# Patient Record
Sex: MALE | Race: WHITE | Employment: FULL TIME | URBAN - METROPOLITAN AREA
[De-identification: names, ages, dates, MRNs, and addresses within clinical notes are randomized per-mention and may not be internally consistent; named-entity substitution may affect disease eponyms.]

---

## 2017-11-14 PROBLEM — J45.30 MILD PERSISTENT ASTHMA WITHOUT COMPLICATION: Status: ACTIVE | Noted: 2017-11-14

## 2017-11-14 PROBLEM — J45.30 MILD PERSISTENT ASTHMA WITHOUT COMPLICATION (HCC): Status: ACTIVE | Noted: 2017-11-14

## 2021-08-31 ENCOUNTER — HOSPITAL ENCOUNTER (EMERGENCY)
Facility: HOSPITAL | Age: 51
Discharge: HOME | End: 2021-08-31
Attending: EMERGENCY MEDICINE
Payer: COMMERCIAL

## 2021-08-31 VITALS
OXYGEN SATURATION: 95 % | SYSTOLIC BLOOD PRESSURE: 129 MMHG | TEMPERATURE: 98 F | DIASTOLIC BLOOD PRESSURE: 85 MMHG | RESPIRATION RATE: 20 BRPM | HEART RATE: 75 BPM

## 2021-08-31 DIAGNOSIS — N39.0 URINARY TRACT INFECTION WITHOUT HEMATURIA, SITE UNSPECIFIED: Primary | ICD-10-CM

## 2021-08-31 LAB
ALBUMIN SERPL-MCNC: 3.9 G/DL (ref 3.4–5)
ALBUMIN/GLOB SERPL: 0.9 {RATIO} (ref 1–2)
ALP LIVER SERPL-CCNC: 77 U/L
ALT SERPL-CCNC: 108 U/L
ANION GAP SERPL CALC-SCNC: 10 MMOL/L (ref 0–18)
AST SERPL-CCNC: 44 U/L (ref 15–37)
BASOPHILS # BLD AUTO: 0.03 X10(3) UL (ref 0–0.2)
BASOPHILS NFR BLD AUTO: 0.2 %
BILIRUB SERPL-MCNC: 1.1 MG/DL (ref 0.1–2)
BILIRUB UR QL STRIP.AUTO: NEGATIVE
BUN BLD-MCNC: 14 MG/DL (ref 7–18)
CALCIUM BLD-MCNC: 9.7 MG/DL (ref 8.5–10.1)
CHLORIDE SERPL-SCNC: 102 MMOL/L (ref 98–112)
CO2 SERPL-SCNC: 25 MMOL/L (ref 21–32)
CREAT BLD-MCNC: 1.13 MG/DL
EOSINOPHIL # BLD AUTO: 0.04 X10(3) UL (ref 0–0.7)
EOSINOPHIL NFR BLD AUTO: 0.3 %
ERYTHROCYTE [DISTWIDTH] IN BLOOD BY AUTOMATED COUNT: 13.7 %
GLOBULIN PLAS-MCNC: 4.2 G/DL (ref 2.8–4.4)
GLUCOSE BLD-MCNC: 126 MG/DL (ref 70–99)
GLUCOSE UR STRIP.AUTO-MCNC: NEGATIVE MG/DL
HCT VFR BLD AUTO: 46.2 %
HGB BLD-MCNC: 15.7 G/DL
IMM GRANULOCYTES # BLD AUTO: 0.04 X10(3) UL (ref 0–1)
IMM GRANULOCYTES NFR BLD: 0.3 %
LYMPHOCYTES # BLD AUTO: 1.11 X10(3) UL (ref 1–4)
LYMPHOCYTES NFR BLD AUTO: 7.7 %
M PROTEIN MFR SERPL ELPH: 8.1 G/DL (ref 6.4–8.2)
MCH RBC QN AUTO: 29.1 PG (ref 26–34)
MCHC RBC AUTO-ENTMCNC: 34 G/DL (ref 31–37)
MCV RBC AUTO: 85.6 FL
MONOCYTES # BLD AUTO: 1.19 X10(3) UL (ref 0.1–1)
MONOCYTES NFR BLD AUTO: 8.3 %
NEUTROPHILS # BLD AUTO: 11.92 X10 (3) UL (ref 1.5–7.7)
NEUTROPHILS # BLD AUTO: 11.92 X10(3) UL (ref 1.5–7.7)
NEUTROPHILS NFR BLD AUTO: 83.2 %
NITRITE UR QL STRIP.AUTO: POSITIVE
OSMOLALITY SERPL CALC.SUM OF ELEC: 286 MOSM/KG (ref 275–295)
PH UR STRIP.AUTO: 6 [PH] (ref 5–8)
PLATELET # BLD AUTO: 200 10(3)UL (ref 150–450)
POTASSIUM SERPL-SCNC: 4 MMOL/L (ref 3.5–5.1)
PROT UR STRIP.AUTO-MCNC: 100 MG/DL
RBC # BLD AUTO: 5.4 X10(6)UL
RBC #/AREA URNS AUTO: >10 /HPF
SODIUM SERPL-SCNC: 137 MMOL/L (ref 136–145)
SP GR UR STRIP.AUTO: 1.03 (ref 1–1.03)
UROBILINOGEN UR STRIP.AUTO-MCNC: <2 MG/DL
WBC # BLD AUTO: 14.3 X10(3) UL (ref 4–11)
WBC #/AREA URNS AUTO: >50 /HPF

## 2021-08-31 PROCEDURE — 87186 SC STD MICRODIL/AGAR DIL: CPT | Performed by: EMERGENCY MEDICINE

## 2021-08-31 PROCEDURE — 99284 EMERGENCY DEPT VISIT MOD MDM: CPT

## 2021-08-31 PROCEDURE — 87077 CULTURE AEROBIC IDENTIFY: CPT | Performed by: EMERGENCY MEDICINE

## 2021-08-31 PROCEDURE — 85025 COMPLETE CBC W/AUTO DIFF WBC: CPT | Performed by: EMERGENCY MEDICINE

## 2021-08-31 PROCEDURE — 96365 THER/PROPH/DIAG IV INF INIT: CPT

## 2021-08-31 PROCEDURE — 80053 COMPREHEN METABOLIC PANEL: CPT | Performed by: EMERGENCY MEDICINE

## 2021-08-31 PROCEDURE — 87086 URINE CULTURE/COLONY COUNT: CPT | Performed by: EMERGENCY MEDICINE

## 2021-08-31 PROCEDURE — 81001 URINALYSIS AUTO W/SCOPE: CPT | Performed by: EMERGENCY MEDICINE

## 2021-08-31 RX ORDER — CEPHALEXIN 500 MG/1
500 CAPSULE ORAL 4 TIMES DAILY
Qty: 40 CAPSULE | Refills: 0 | Status: SHIPPED | OUTPATIENT
Start: 2021-08-31 | End: 2021-09-10

## 2021-08-31 RX ORDER — ONDANSETRON 4 MG/1
4 TABLET, ORALLY DISINTEGRATING ORAL EVERY 4 HOURS PRN
Qty: 10 TABLET | Refills: 0 | Status: SHIPPED | OUTPATIENT
Start: 2021-08-31 | End: 2021-09-07

## 2021-09-01 NOTE — ED PROVIDER NOTES
Patient Seen in: [facility] Emergency Department      History   Patient presents with:  Urinary Symptoms    Stated Complaint: Fever, UTI symptoms     HPI/Subjective:   HPI    Josesito Low is a 58-year-old male presenting with fever.   Patient is having so distress  HEENT exam: Normal  Lungs are clear to auscultation bilaterally with no wheezes retractions or rhonchi noted  Cardiovascular exam shows a regular rate and rhythm  Abdomen soft nontender with no rebound tenderness noted  Extremity exam shows no cl emergency department there is no signs of hemodynamic instability feel the patient is a good outpatient candidate patient be discharged home placed on Keflex for outpatient antibiotic and Zofran for nausea patient is return the emergency department for wor

## 2021-09-08 ENCOUNTER — HOSPITAL ENCOUNTER (OUTPATIENT)
Dept: CT IMAGING | Age: 51
Discharge: HOME | End: 2021-09-08
Attending: INTERNAL MEDICINE
Payer: COMMERCIAL

## 2021-09-08 DIAGNOSIS — R31.9 HEMATURIA, UNSPECIFIED TYPE: ICD-10-CM

## 2021-09-08 DIAGNOSIS — R10.9 FLANK PAIN: ICD-10-CM

## 2021-09-08 DIAGNOSIS — N39.0 URINARY TRACT INFECTION WITHOUT HEMATURIA, SITE UNSPECIFIED: ICD-10-CM

## 2021-09-08 PROCEDURE — 74176 CT ABD & PELVIS W/O CONTRAST: CPT | Performed by: INTERNAL MEDICINE

## 2021-09-22 ENCOUNTER — LAB ENCOUNTER (OUTPATIENT)
Dept: LAB | Age: 51
End: 2021-09-22
Attending: INTERNAL MEDICINE
Payer: COMMERCIAL

## 2021-09-22 DIAGNOSIS — N39.0 URINARY TRACT INFECTION WITHOUT HEMATURIA, SITE UNSPECIFIED: ICD-10-CM

## 2021-09-22 LAB
ANION GAP SERPL CALC-SCNC: 2 MMOL/L (ref 0–18)
BASOPHILS # BLD AUTO: 0.04 X10(3) UL (ref 0–0.2)
BASOPHILS NFR BLD AUTO: 0.4 %
BILIRUB UR QL STRIP.AUTO: NEGATIVE
BUN BLD-MCNC: 19 MG/DL (ref 7–18)
CALCIUM BLD-MCNC: 9.3 MG/DL (ref 8.5–10.1)
CHLORIDE SERPL-SCNC: 107 MMOL/L (ref 98–112)
CO2 SERPL-SCNC: 29 MMOL/L (ref 21–32)
COLOR UR AUTO: YELLOW
CREAT BLD-MCNC: 1.3 MG/DL
EOSINOPHIL # BLD AUTO: 0.38 X10(3) UL (ref 0–0.7)
EOSINOPHIL NFR BLD AUTO: 4.1 %
ERYTHROCYTE [DISTWIDTH] IN BLOOD BY AUTOMATED COUNT: 13.9 %
GLUCOSE BLD-MCNC: 107 MG/DL (ref 70–99)
GLUCOSE UR STRIP.AUTO-MCNC: NEGATIVE MG/DL
HCT VFR BLD AUTO: 44.2 %
HGB BLD-MCNC: 14.9 G/DL
IMM GRANULOCYTES # BLD AUTO: 0.03 X10(3) UL (ref 0–1)
IMM GRANULOCYTES NFR BLD: 0.3 %
KETONES UR STRIP.AUTO-MCNC: NEGATIVE MG/DL
LYMPHOCYTES # BLD AUTO: 2.46 X10(3) UL (ref 1–4)
LYMPHOCYTES NFR BLD AUTO: 26.2 %
MCH RBC QN AUTO: 29.3 PG (ref 26–34)
MCHC RBC AUTO-ENTMCNC: 33.7 G/DL (ref 31–37)
MCV RBC AUTO: 87 FL
MONOCYTES # BLD AUTO: 0.65 X10(3) UL (ref 0.1–1)
MONOCYTES NFR BLD AUTO: 6.9 %
NEUTROPHILS # BLD AUTO: 5.82 X10 (3) UL (ref 1.5–7.7)
NEUTROPHILS # BLD AUTO: 5.82 X10(3) UL (ref 1.5–7.7)
NEUTROPHILS NFR BLD AUTO: 62.1 %
NITRITE UR QL STRIP.AUTO: POSITIVE
OSMOLALITY SERPL CALC.SUM OF ELEC: 289 MOSM/KG (ref 275–295)
PATIENT FASTING Y/N/NP: NO
PH UR STRIP.AUTO: 5 [PH] (ref 5–8)
PLATELET # BLD AUTO: 314 10(3)UL (ref 150–450)
POTASSIUM SERPL-SCNC: 4.7 MMOL/L (ref 3.5–5.1)
PROT UR STRIP.AUTO-MCNC: NEGATIVE MG/DL
RBC # BLD AUTO: 5.08 X10(6)UL
RBC #/AREA URNS AUTO: >10 /HPF
SODIUM SERPL-SCNC: 138 MMOL/L (ref 136–145)
SP GR UR STRIP.AUTO: 1.01 (ref 1–1.03)
UROBILINOGEN UR STRIP.AUTO-MCNC: <2 MG/DL
WBC # BLD AUTO: 9.4 X10(3) UL (ref 4–11)
WBC #/AREA URNS AUTO: >50 /HPF
WBC CLUMPS UR QL AUTO: PRESENT /HPF

## 2021-09-22 PROCEDURE — 87077 CULTURE AEROBIC IDENTIFY: CPT

## 2021-09-22 PROCEDURE — 87086 URINE CULTURE/COLONY COUNT: CPT

## 2021-09-22 PROCEDURE — 81001 URINALYSIS AUTO W/SCOPE: CPT

## 2021-09-22 PROCEDURE — 36415 COLL VENOUS BLD VENIPUNCTURE: CPT

## 2021-09-22 PROCEDURE — 80048 BASIC METABOLIC PNL TOTAL CA: CPT

## 2021-09-22 PROCEDURE — 85025 COMPLETE CBC W/AUTO DIFF WBC: CPT

## 2021-09-22 PROCEDURE — 87186 SC STD MICRODIL/AGAR DIL: CPT

## 2021-11-18 ENCOUNTER — ORDER TRANSCRIPTION (OUTPATIENT)
Dept: ADMINISTRATIVE | Facility: HOSPITAL | Age: 51
End: 2021-11-18

## 2021-11-18 DIAGNOSIS — Z13.6 SCREENING FOR CARDIOVASCULAR CONDITION: Primary | ICD-10-CM

## 2021-11-19 ENCOUNTER — HOSPITAL ENCOUNTER (OUTPATIENT)
Dept: CT IMAGING | Facility: HOSPITAL | Age: 51
Discharge: HOME | End: 2021-11-19
Attending: INTERNAL MEDICINE

## 2021-11-19 DIAGNOSIS — Z13.6 SCREENING FOR CARDIOVASCULAR CONDITION: ICD-10-CM

## 2021-11-19 DIAGNOSIS — E78.49 OTHER HYPERLIPIDEMIA: ICD-10-CM

## 2022-04-04 ENCOUNTER — OFFICE VISIT (OUTPATIENT)
Dept: ORTHOPEDICS CLINIC | Facility: CLINIC | Age: 52
End: 2022-04-04
Payer: COMMERCIAL

## 2022-04-04 ENCOUNTER — HOSPITAL ENCOUNTER (OUTPATIENT)
Dept: GENERAL RADIOLOGY | Age: 52
Discharge: HOME | End: 2022-04-04
Attending: ORTHOPAEDIC SURGERY
Payer: COMMERCIAL

## 2022-04-04 VITALS — WEIGHT: 232 LBS | BODY MASS INDEX: 30.75 KG/M2 | HEIGHT: 73 IN

## 2022-04-04 DIAGNOSIS — M79.644 CHRONIC PAIN OF RIGHT THUMB: ICD-10-CM

## 2022-04-04 DIAGNOSIS — G89.29 CHRONIC PAIN OF RIGHT THUMB: ICD-10-CM

## 2022-04-04 DIAGNOSIS — G89.29 CHRONIC PAIN OF RIGHT THUMB: Primary | ICD-10-CM

## 2022-04-04 DIAGNOSIS — M79.644 CHRONIC PAIN OF RIGHT THUMB: Primary | ICD-10-CM

## 2022-04-04 DIAGNOSIS — M65.4 TENOSYNOVITIS OF RADIAL STYLOID: ICD-10-CM

## 2022-04-04 PROCEDURE — 73140 X-RAY EXAM OF FINGER(S): CPT | Performed by: ORTHOPAEDIC SURGERY

## 2022-04-04 PROCEDURE — 20550 NJX 1 TENDON SHEATH/LIGAMENT: CPT | Performed by: ORTHOPAEDIC SURGERY

## 2022-04-04 PROCEDURE — 3008F BODY MASS INDEX DOCD: CPT | Performed by: ORTHOPAEDIC SURGERY

## 2022-04-04 PROCEDURE — 99203 OFFICE O/P NEW LOW 30 MIN: CPT | Performed by: ORTHOPAEDIC SURGERY

## 2022-04-04 RX ORDER — BETAMETHASONE SODIUM PHOSPHATE AND BETAMETHASONE ACETATE 3; 3 MG/ML; MG/ML
6 INJECTION, SUSPENSION INTRA-ARTICULAR; INTRALESIONAL; INTRAMUSCULAR; SOFT TISSUE ONCE
Status: COMPLETED | OUTPATIENT
Start: 2022-04-04 | End: 2022-04-04

## 2022-04-04 RX ADMIN — BETAMETHASONE SODIUM PHOSPHATE AND BETAMETHASONE ACETATE 6 MG: 3; 3 INJECTION, SUSPENSION INTRA-ARTICULAR; INTRALESIONAL; INTRAMUSCULAR; SOFT TISSUE at 14:52:00

## 2023-01-12 ENCOUNTER — APPOINTMENT (OUTPATIENT)
Dept: GENERAL RADIOLOGY | Age: 53
End: 2023-01-12
Attending: NURSE PRACTITIONER
Payer: COMMERCIAL

## 2023-01-12 ENCOUNTER — HOSPITAL ENCOUNTER (OUTPATIENT)
Age: 53
Discharge: HOME | End: 2023-01-12
Payer: COMMERCIAL

## 2023-01-12 VITALS
HEART RATE: 70 BPM | SYSTOLIC BLOOD PRESSURE: 135 MMHG | HEIGHT: 73 IN | BODY MASS INDEX: 31.81 KG/M2 | RESPIRATION RATE: 20 BRPM | TEMPERATURE: 99 F | DIASTOLIC BLOOD PRESSURE: 86 MMHG | WEIGHT: 240 LBS

## 2023-01-12 DIAGNOSIS — R06.2 WHEEZING: ICD-10-CM

## 2023-01-12 DIAGNOSIS — J06.9 UPPER RESPIRATORY TRACT INFECTION, UNSPECIFIED TYPE: Primary | ICD-10-CM

## 2023-01-12 LAB — SARS-COV-2 RNA RESP QL NAA+PROBE: NOT DETECTED

## 2023-01-12 PROCEDURE — 71046 X-RAY EXAM CHEST 2 VIEWS: CPT | Performed by: NURSE PRACTITIONER

## 2023-01-12 PROCEDURE — 99214 OFFICE O/P EST MOD 30 MIN: CPT

## 2023-01-12 PROCEDURE — 99213 OFFICE O/P EST LOW 20 MIN: CPT

## 2023-01-12 RX ORDER — PREDNISONE 20 MG/1
60 TABLET ORAL ONCE
Status: COMPLETED | OUTPATIENT
Start: 2023-01-12 | End: 2023-01-12

## 2023-01-12 RX ORDER — ALBUTEROL SULFATE 90 UG/1
2 AEROSOL, METERED RESPIRATORY (INHALATION) EVERY 4 HOURS PRN
Qty: 1 EACH | Refills: 0 | Status: SHIPPED | OUTPATIENT
Start: 2023-01-12 | End: 2023-02-11

## 2023-01-12 RX ORDER — PREDNISONE 20 MG/1
40 TABLET ORAL DAILY
Qty: 10 TABLET | Refills: 0 | Status: SHIPPED | OUTPATIENT
Start: 2023-01-13 | End: 2023-01-18

## 2023-01-12 RX ORDER — ALBUTEROL SULFATE 90 UG/1
2 AEROSOL, METERED RESPIRATORY (INHALATION) ONCE
Status: COMPLETED | OUTPATIENT
Start: 2023-01-12 | End: 2023-01-12

## 2023-01-19 ENCOUNTER — HOSPITAL ENCOUNTER (OUTPATIENT)
Age: 53
Discharge: HOME | End: 2023-01-19
Attending: EMERGENCY MEDICINE
Payer: COMMERCIAL

## 2023-01-19 ENCOUNTER — APPOINTMENT (OUTPATIENT)
Dept: GENERAL RADIOLOGY | Age: 53
End: 2023-01-19
Attending: EMERGENCY MEDICINE
Payer: COMMERCIAL

## 2023-01-19 VITALS
RESPIRATION RATE: 20 BRPM | TEMPERATURE: 99 F | DIASTOLIC BLOOD PRESSURE: 86 MMHG | OXYGEN SATURATION: 96 % | HEART RATE: 76 BPM | SYSTOLIC BLOOD PRESSURE: 137 MMHG

## 2023-01-19 DIAGNOSIS — J18.9 WALKING PNEUMONIA: ICD-10-CM

## 2023-01-19 DIAGNOSIS — J98.01 BRONCHOSPASM: ICD-10-CM

## 2023-01-19 DIAGNOSIS — J45.41 MODERATE PERSISTENT REACTIVE AIRWAY DISEASE WITH ACUTE EXACERBATION: Primary | ICD-10-CM

## 2023-01-19 PROCEDURE — 99214 OFFICE O/P EST MOD 30 MIN: CPT

## 2023-01-19 PROCEDURE — 71046 X-RAY EXAM CHEST 2 VIEWS: CPT | Performed by: EMERGENCY MEDICINE

## 2023-01-19 PROCEDURE — 99213 OFFICE O/P EST LOW 20 MIN: CPT

## 2023-01-19 RX ORDER — METHYLPREDNISOLONE 4 MG/1
TABLET ORAL
Qty: 1 EACH | Refills: 0 | Status: SHIPPED | OUTPATIENT
Start: 2023-01-19

## 2023-01-19 RX ORDER — ALBUTEROL SULFATE 2.5 MG/3ML
2.5 SOLUTION RESPIRATORY (INHALATION) EVERY 4 HOURS PRN
Qty: 30 EACH | Refills: 0 | Status: SHIPPED | OUTPATIENT
Start: 2023-01-19 | End: 2023-02-18

## 2023-01-19 RX ORDER — ALBUTEROL SULFATE 90 UG/1
AEROSOL, METERED RESPIRATORY (INHALATION) EVERY 4 HOURS PRN
Qty: 1 EACH | Refills: 0 | Status: SHIPPED | OUTPATIENT
Start: 2023-01-19 | End: 2024-01-19

## 2023-01-19 RX ORDER — PREDNISONE 20 MG/1
40 TABLET ORAL ONCE
Status: COMPLETED | OUTPATIENT
Start: 2023-01-19 | End: 2023-01-19

## 2023-01-19 RX ORDER — AZITHROMYCIN 250 MG/1
TABLET, FILM COATED ORAL
Qty: 6 TABLET | Refills: 0 | Status: SHIPPED | OUTPATIENT
Start: 2023-01-19 | End: 2023-01-24

## 2023-01-19 RX ORDER — BENZONATATE 100 MG/1
CAPSULE ORAL NIGHTLY PRN
Qty: 20 CAPSULE | Refills: 0 | Status: SHIPPED | OUTPATIENT
Start: 2023-01-19

## 2023-01-20 NOTE — ED INITIAL ASSESSMENT (HPI)
C/o SOB starting 1/12. Pt reports recent dc from [facility] last week for same symptoms. Pt reports symptoms productive cough with yellow sputum, sneezing. Pt reports taking prescribed meds and finishing meds. Pt reports difficulty sleeping and sitting up while sleeping. Pt sleeps with CPAP. Inspiratory and expiratory wheezing. RR 32. Dyspnea with rest and exertion. SPO2 94% RA.

## 2023-01-20 NOTE — DISCHARGE INSTRUCTIONS
Consider obtaining a pulse oximeter and check the level 3-4 times a day. Medrol Dosepak, start tomorrow. Albuterol 8 puffs every 3-4 hours for the next 3 to 4 days, then taper down as tolerated  Take Symbicort 2 puffs twice a day for the next few weeks  Take 10 deep breaths with the incentive spirometer every hour while awake  Talk to Dr. Katrin Ma regarding an albuterol nebulizer or other treatment.   Take a Z-Manuel in case of a brewing pneumonia that is not yet showing up on chest x-ray  Tessalon if needed to control cough at night so you can sleep  Return for any problems

## 2023-01-20 NOTE — ED NOTES
Patient called stating that Yvonne needs us to call with clarification on the albuterol. Spoke with pharmacist at [facility] and they state the insurance won't cover the 2-8 puffs Q4H PRN - she states she needs additional information/diagnosis to provide the insurance. Notified pharmacist of diagnosis of moderate persistent reactive airway disease with acute exacerbation, walking pneumonia, and bronchitis. She put message into system and states they won't cover it over 16 days. She was notified patient most likely won't need that dose for that length of time and was also recommended to follow-up with his PCP. She verbalizes understanding and will process the prescription for the 16 days.

## 2023-09-20 ENCOUNTER — HOSPITAL ENCOUNTER (OUTPATIENT)
Dept: CT IMAGING | Age: 53
Discharge: HOME | End: 2023-09-20
Attending: ORTHOPAEDIC SURGERY
Payer: COMMERCIAL

## 2023-09-20 DIAGNOSIS — S82.143A TIBIAL PLATEAU FRACTURE: ICD-10-CM

## 2023-09-20 PROCEDURE — 73700 CT LOWER EXTREMITY W/O DYE: CPT | Performed by: ORTHOPAEDIC SURGERY

## 2023-10-12 ENCOUNTER — ORDER TRANSCRIPTION (OUTPATIENT)
Dept: PHYSICAL THERAPY | Facility: HOSPITAL | Age: 53
End: 2023-10-12

## 2023-10-12 DIAGNOSIS — S82.125D: Primary | ICD-10-CM

## 2023-10-16 ENCOUNTER — TELEPHONE (OUTPATIENT)
Dept: PHYSICAL THERAPY | Facility: HOSPITAL | Age: 53
End: 2023-10-16

## 2023-10-17 ENCOUNTER — OFFICE VISIT (OUTPATIENT)
Dept: PHYSICAL THERAPY | Facility: HOSPITAL | Age: 53
End: 2023-10-17
Attending: ORTHOPAEDIC SURGERY
Payer: COMMERCIAL

## 2023-10-17 DIAGNOSIS — S82.125D: Primary | ICD-10-CM

## 2023-10-17 PROCEDURE — 97161 PT EVAL LOW COMPLEX 20 MIN: CPT

## 2023-10-17 PROCEDURE — 97110 THERAPEUTIC EXERCISES: CPT

## 2023-10-20 ENCOUNTER — OFFICE VISIT (OUTPATIENT)
Dept: PHYSICAL THERAPY | Facility: HOSPITAL | Age: 53
End: 2023-10-20
Attending: ORTHOPAEDIC SURGERY
Payer: COMMERCIAL

## 2023-10-20 PROCEDURE — 97110 THERAPEUTIC EXERCISES: CPT

## 2023-10-20 PROCEDURE — 97140 MANUAL THERAPY 1/> REGIONS: CPT

## 2023-10-20 NOTE — PROGRESS NOTES
Diagnosis:   Nondisplaced fracture of lateral condyle of left tibia, subsequent encounter for closed fracture with routine healing (S82.886C)         Referring Provider: Elmo Rankin  Date of Evaluation:    10/17/23    Precautions:   50% WB on L Next MD visit:   none scheduled  Date of Surgery: 9/29/23   Insurance Primary/Secondary: [place] / N/A     # Auth Visits: 15 per POC            Subjective: Patient reports having slightly increased swelling in the L knee now that he has been up on his feet more. Axillary crutches are working out well. He is having some stiffness and soreness in the L posterolateral hip as well as medial knee soreness at night. Pain: 3/10 worst      Objective:   AROM: (* denotes performed with pain)   Knee    Flexion: R WNL; L 115 deg   Extension: R 0; L -2      PROM: (* denotes performed with pain)   Knee    Flexion: R WNL; L 118   Extension: R 0; L 0          Assessment: Patient presents with improved knee ROM compared to IE. There is less anterior knee soreness with active and passive terminal knee extension this session. Tolerated addition of hip strengthening exercises. Slightly limited in L tibial ER.       Goals:   (To be met in 15 visits)   Pt will improve knee extension ROM to 0 deg to allow proper heel strike during gait and terminal knee extension in stance  Pt will improve knee AROM flexion to >120 degrees to improve ability to perform stairs  Pt will improve quad strength to 5/5 to ascend 1 flight of stairs reciprocally without UE assist  Pt will increase hip and knee strength to grossly 4+/5 to be able to get up and down from the floor safely  Pt will demonstrate increased hip ER/ABD strength to 5/5 to perform stepping and squatting activities without excessive femoral IR/ADD  Pt will improve SLS to >10s to improve safety and independence with gait on uneven surfaces such as grass  Pt will be independent and compliant with comprehensive HEP to maintain progress achieved in PT    Plan: continue PT to improve strength, ROM, and function of the LLE  Date: 10/20/2023  TX#: 2/15 Date:                 TX#: 3/ Date:                 TX#: 4/ Date:                 TX#: 5/ Date:    Tx#: 6/   S/l clam, 10; RTB, 10  S/l hip abduction, 2x8  SAQ, 10  SLR, 10; 1#, 10  Seated tibial IR/ER active       Manual:  Passive knee flexion and extension  Tib/fem AP in 90 deg flexion   Prox tib/fib AP in sitting                     HEP: Quad sets  SLR  Clam  S/l hip abduction    Charges: man, 2TE       Total Timed Treatment: 42 min  Total Treatment Time: 42 min

## 2023-10-27 ENCOUNTER — OFFICE VISIT (OUTPATIENT)
Dept: PHYSICAL THERAPY | Facility: HOSPITAL | Age: 53
End: 2023-10-27
Attending: INTERNAL MEDICINE

## 2023-10-27 PROCEDURE — 97110 THERAPEUTIC EXERCISES: CPT

## 2023-10-27 PROCEDURE — 97140 MANUAL THERAPY 1/> REGIONS: CPT

## 2023-10-27 NOTE — PROGRESS NOTES
Diagnosis:   Nondisplaced fracture of lateral condyle of left tibia, subsequent encounter for closed fracture with routine healing (M39.995N)         Referring Provider: No ref. provider found  Date of Evaluation:    10/17/23    Precautions:   50% WB on L Next MD visit:   none scheduled  Date of Surgery: 9/29/23   Insurance Primary/Secondary: [place] / N/A     # Auth Visits: 15 per POC            Subjective: Patient reports feeling continuously better. Still some sweling in the L knee but not really having any pain. Pain: 0/10      Objective:   AROM: (* denotes performed with pain)   Knee    Flexion: R WNL; L 122 deg   Extension: R 0; L 0      PROM: (* denotes performed with pain)   Knee    Flexion: R WNL; L 124   Extension: R 0; L 0          Assessment: Patient continues to improve in knee ROM. He tolerated light closed chain exercises with WB up to 50%. Has follow up with surgeon next week and will likely progress WB status at that point.        Goals:   (To be met in 15 visits)   Pt will improve knee extension ROM to 0 deg to allow proper heel strike during gait and terminal knee extension in stance  Pt will improve knee AROM flexion to >120 degrees to improve ability to perform stairs  Pt will improve quad strength to 5/5 to ascend 1 flight of stairs reciprocally without UE assist  Pt will increase hip and knee strength to grossly 4+/5 to be able to get up and down from the floor safely  Pt will demonstrate increased hip ER/ABD strength to 5/5 to perform stepping and squatting activities without excessive femoral IR/ADD  Pt will improve SLS to >10s to improve safety and independence with gait on uneven surfaces such as grass  Pt will be independent and compliant with comprehensive HEP to maintain progress achieved in PT    Plan: continue PT to improve strength, ROM, and function of the LLE  Date: 10/20/2023  TX#: 2/15 Date:  10/27/23               TX#: 3/15 Date:                 TX#: 4/ Date: TX#: 5/ Date:    Tx#: 6/   S/l clam, 10; RTB, 10  S/l hip abduction, 2x8  SAQ, 10  SLR, 10; 1#, 10  Seated tibial IR/ER active Standing LAQ, YTB, 2x10  Standing SLR, YTB, 10  Forward lunge onto 8\" step partial WB, 15  Airex weight shift, 20  Gastroc stretch, 3x20 sec  Bridge, 10  HS stretch, 3x30 sec        Manual:  Passive knee flexion and extension  Tib/fem AP in 90 deg flexion   Prox tib/fib AP in sitting Manual:  Passive knee flexion and extension  Tib/fem AP in 90 deg flexion                     HEP: Quad sets  SLR  Clam  S/l hip abduction    Charges: man, 2TE       Total Timed Treatment: 42 min  Total Treatment Time: 42 min

## 2023-11-03 ENCOUNTER — OFFICE VISIT (OUTPATIENT)
Dept: PHYSICAL THERAPY | Facility: HOSPITAL | Age: 53
End: 2023-11-03
Attending: INTERNAL MEDICINE
Payer: COMMERCIAL

## 2023-11-03 PROCEDURE — 97140 MANUAL THERAPY 1/> REGIONS: CPT

## 2023-11-03 PROCEDURE — 97110 THERAPEUTIC EXERCISES: CPT

## 2023-11-03 NOTE — PROGRESS NOTES
Diagnosis:   Nondisplaced fracture of lateral condyle of left tibia, subsequent encounter for closed fracture with routine healing (J96.583C)         Referring Provider: No ref. provider found  Date of Evaluation:    10/17/23    Precautions:   50% WB on L Next MD visit:   none scheduled  Date of Surgery: 9/29/23   Insurance Primary/Secondary: [place] / N/A     # Auth Visits: 15 per POC            Subjective: Patient saw his surgeon this week and got clearance for WBAT. Also cleared to drive and fly. Has been walking without crutches but it feels \"funny\". Sometimes has a sharp pain in the medial knee and sometimes anterior. Also reports paresthesia in his medial arch when walking. Pain: 0/10      Objective:   AROM: (* denotes performed with pain)   Knee    Flexion: R WNL; L 122 deg   Extension: R 0; L 0      PROM: (* denotes performed with pain)   Knee    Flexion: R WNL; L 135   Extension: R 0; L 0          Assessment: Patient tolerated weight bearing exercises today. He does have limited knee extension with gait which improved with cuing. Also some antalgia and anterior knee pain with longer stride. Added standing hip flexor stretch to help with stride length. Also added open chain quad strengthening with GTB.        Goals:   (To be met in 15 visits)   Pt will improve knee extension ROM to 0 deg to allow proper heel strike during gait and terminal knee extension in stance  Pt will improve knee AROM flexion to >120 degrees to improve ability to perform stairs  Pt will improve quad strength to 5/5 to ascend 1 flight of stairs reciprocally without UE assist  Pt will increase hip and knee strength to grossly 4+/5 to be able to get up and down from the floor safely  Pt will demonstrate increased hip ER/ABD strength to 5/5 to perform stepping and squatting activities without excessive femoral IR/ADD  Pt will improve SLS to >10s to improve safety and independence with gait on uneven surfaces such as grass  Pt will be independent and compliant with comprehensive HEP to maintain progress achieved in PT    Plan: continue PT to improve strength, ROM, and function of the LLE  Date: 10/20/2023  TX#: 2/15 Date:  10/27/23               TX#: 3/15 Date:  11/3/23               TX#: 4/15 Date:                 TX#: 5/ Date:    Tx#: 6/   S/l clam, 10; RTB, 10  S/l hip abduction, 2x8  SAQ, 10  SLR, 10; 1#, 10  Seated tibial IR/ER active Standing LAQ, YTB, 2x10  Standing SLR, YTB, 10  Forward lunge onto 8\" step partial WB, 15  Airex weight shift, 20  Gastroc stretch, 3x20 sec  Bridge, 10  HS stretch, 3x30 sec   Stationary bike, lv 2, 5 min  Gait training in // bars, 5 min  Tiltboard AP and ML, 5 min  Lateral band walk, GTB, 3 laps  FSU, 4\" step, 2x10  Standing HF stretch, 5x10 sec  Seated knee extension GTB, 2x12     Manual:  Passive knee flexion and extension  Tib/fem AP in 90 deg flexion   Prox tib/fib AP in sitting Manual:  Passive knee flexion and extension  Tib/fem AP in 90 deg flexion  Manual:  Passive knee flexion and extension  Tib/fem AP in 0 deg flexion                    HEP: Quad sets  Seated knee extension, GTB  Clam, GTB  Standing HF stretch    Charges: man, 2TE       Total Timed Treatment: 45 min  Total Treatment Time: 45 min

## 2023-11-10 ENCOUNTER — OFFICE VISIT (OUTPATIENT)
Dept: PHYSICAL THERAPY | Facility: HOSPITAL | Age: 53
End: 2023-11-10
Attending: INTERNAL MEDICINE
Payer: COMMERCIAL

## 2023-11-10 PROCEDURE — 97140 MANUAL THERAPY 1/> REGIONS: CPT

## 2023-11-10 PROCEDURE — 97110 THERAPEUTIC EXERCISES: CPT

## 2023-11-10 NOTE — PROGRESS NOTES
Diagnosis:   Nondisplaced fracture of lateral condyle of left tibia, subsequent encounter for closed fracture with routine healing (R47.889S)         Referring Provider: No ref. provider found  Date of Evaluation:    10/17/23    Precautions:   50% WB on L Next MD visit:   none scheduled  Date of Surgery: 9/29/23   Insurance Primary/Secondary: [place] / N/A     # Auth Visits: 15 per POC            Subjective: Patient was out of town last week. Had increased swelling after being on the plane with swelling to the ankle but this was back to baseline by the following day. Still having some anterior and medial knee soreness with full extension. Ambulation without crutches has been ok but the L leg still feels weak. Pain: 0/10      Objective:   AROM: (* denotes performed with pain)   Knee    Flexion: R WNL; L 122 deg   Extension: R 0; L 0      PROM: (* denotes performed with pain)   Knee    Flexion: R WNL; L 135   Extension: R 0; L 0          Assessment: Patient responded well to joint mobilizations in prone with improved knee extension and decreased pain with walking. Tolerated increase in resistance with exercises, including squatting and this was added to HEP along with practicing single leg balance.        Goals:   (To be met in 15 visits)   Pt will improve knee extension ROM to 0 deg to allow proper heel strike during gait and terminal knee extension in stance  Pt will improve knee AROM flexion to >120 degrees to improve ability to perform stairs  Pt will improve quad strength to 5/5 to ascend 1 flight of stairs reciprocally without UE assist  Pt will increase hip and knee strength to grossly 4+/5 to be able to get up and down from the floor safely  Pt will demonstrate increased hip ER/ABD strength to 5/5 to perform stepping and squatting activities without excessive femoral IR/ADD  Pt will improve SLS to >10s to improve safety and independence with gait on uneven surfaces such as grass  Pt will be independent and compliant with comprehensive HEP to maintain progress achieved in PT    Plan: continue PT to improve strength, ROM, and function of the LLE  Date: 10/20/2023  TX#: 2/15 Date:  10/27/23               TX#: 3/15 Date:  11/3/23               TX#: 4/15 Date:  11/10/23               TX#: 5/15 Date:    Tx#: 6/   S/l clam, 10; RTB, 10  S/l hip abduction, 2x8  SAQ, 10  SLR, 10; 1#, 10  Seated tibial IR/ER active Standing LAQ, YTB, 2x10  Standing SLR, YTB, 10  Forward lunge onto 8\" step partial WB, 15  Airex weight shift, 20  Gastroc stretch, 3x20 sec  Bridge, 10  HS stretch, 3x30 sec   Stationary bike, lv 2, 5 min  Gait training in // bars, 5 min  Tiltboard AP and ML, 5 min  Lateral band walk, GTB, 3 laps  FSU, 4\" step, 2x10  Standing HF stretch, 5x10 sec  Seated knee extension GTB, 2x12 Stationary bike, lv 2, 5 min  Knee extension/HS stretch on step, 3x10 sec  Lateral band walk, GTB, 2 laps  Retro band walk, GTB, 2 laps  Shuttle SLP, 37#, 2x12  Squat at table, 2x5  Seated knee extension, 25#, 2x10  Prone quad stretch, 3x15 sec    Manual:  Passive knee flexion and extension  Tib/fem AP in 90 deg flexion   Prox tib/fib AP in sitting Manual:  Passive knee flexion and extension  Tib/fem AP in 90 deg flexion  Manual:  Passive knee flexion and extension  Tib/fem AP in 0 deg flexion  Manual:  Passive knee flexion and extension  Tib/fem AP in 5 deg flexion in prone                  HEP: Quad sets  Seated knee extension, GTB  Clam, GTB  Standing HF stretch  Mini squat, 3x5  SLS practice    Charges: man (10 min), 2TE (30 min)      Total Timed Treatment: 40 min  Total Treatment Time: 40 min

## 2023-11-13 ENCOUNTER — OFFICE VISIT (OUTPATIENT)
Dept: PHYSICAL THERAPY | Facility: HOSPITAL | Age: 53
End: 2023-11-13
Attending: INTERNAL MEDICINE
Payer: COMMERCIAL

## 2023-11-13 PROCEDURE — 97140 MANUAL THERAPY 1/> REGIONS: CPT

## 2023-11-13 PROCEDURE — 97110 THERAPEUTIC EXERCISES: CPT

## 2023-11-13 NOTE — PROGRESS NOTES
Diagnosis:   Nondisplaced fracture of lateral condyle of left tibia, subsequent encounter for closed fracture with routine healing (I81.645U)         Referring Provider: No ref. provider found  Date of Evaluation:    10/17/23    Precautions:   50% WB on L Next MD visit:   none scheduled  Date of Surgery: 9/29/23   Insurance Primary/Secondary: [place] / N/[place]     # Auth Visits: 15 per POC            Subjective: Patient reports feeling stiff in the L knee the day after previous session but continued walking and doing his exercises and feels ok overall. Pain: 0/10      Objective:   AROM: (* denotes performed with pain)   Knee    Flexion: R WNL; L 122 deg   Extension: R 0; L 0      PROM: (* denotes performed with pain)   Knee    Flexion: R WNL; L 135   Extension: R 0; L 0          Assessment: Patient continues to favor the LLE with ambulation. There is limitation in quad strength on the L that likely contributes to this. Also limited balance on the L side contributing to deficits in stance phase stability during gait. Added seated knee flexion and extension w/ TB to HEP.        Goals:   (To be met in 15 visits)   Pt will improve knee extension ROM to 0 deg to allow proper heel strike during gait and terminal knee extension in stance  Pt will improve knee AROM flexion to >120 degrees to improve ability to perform stairs  Pt will improve quad strength to 5/5 to ascend 1 flight of stairs reciprocally without UE assist  Pt will increase hip and knee strength to grossly 4+/5 to be able to get up and down from the floor safely  Pt will demonstrate increased hip ER/ABD strength to 5/5 to perform stepping and squatting activities without excessive femoral IR/ADD  Pt will improve SLS to >10s to improve safety and independence with gait on uneven surfaces such as grass  Pt will be independent and compliant with comprehensive HEP to maintain progress achieved in PT    Plan: continue PT to improve strength, ROM, and function of the LLE  Date: 10/20/2023  TX#: 2/15 Date:  10/27/23               TX#: 3/15 Date:  11/3/23               TX#: 4/15 Date:  11/10/23               TX#: 5/15 Date: 11/13/23  Tx#: 6/15   S/l clam, 10; RTB, 10  S/l hip abduction, 2x8  SAQ, 10  SLR, 10; 1#, 10  Seated tibial IR/ER active Standing LAQ, YTB, 2x10  Standing SLR, YTB, 10  Forward lunge onto 8\" step partial WB, 15  Airex weight shift, 20  Gastroc stretch, 3x20 sec  Bridge, 10  HS stretch, 3x30 sec   Stationary bike, lv 2, 5 min  Gait training in // bars, 5 min  Tiltboard AP and ML, 5 min  Lateral band walk, GTB, 3 laps  FSU, 4\" step, 2x10  Standing HF stretch, 5x10 sec  Seated knee extension GTB, 2x12 Stationary bike, lv 2, 5 min  Knee extension/HS stretch on step, 3x10 sec  Lateral band walk, GTB, 2 laps  Retro band walk, GTB, 2 laps  Shuttle SLP, 37#, 2x12  Squat at table, 2x5  Seated knee extension, 25#, 2x10  Prone quad stretch, 3x15 sec Stationary bike, lv 3, 5 min  Shuttle SLP, 37#, 2x12  Bridge, 10; single leg, 8; w/ SB, 8  Squat to table, 2x8  Lunge on step, 6\", 2x10  Seated knee extension, BTB, 2x10  Seated knee flexion, BTB, 10  Airex SLS, 8x5sec   Manual:  Passive knee flexion and extension  Tib/fem AP in 90 deg flexion   Prox tib/fib AP in sitting Manual:  Passive knee flexion and extension  Tib/fem AP in 90 deg flexion  Manual:  Passive knee flexion and extension  Tib/fem AP in 0 deg flexion  Manual:  Passive knee flexion and extension  Tib/fem AP in 5 deg flexion in prone Manual:  Passive knee flexion and extension  Tib/fem AP in 5 deg flexion in prone                 HEP:   Seated knee flexion and extension, BTB  Mini squat, 3x5  SLS practice    Charges: man (10 min), 2TE (30 min)      Total Timed Treatment: 40 min  Total Treatment Time: 40 min

## 2023-11-17 ENCOUNTER — OFFICE VISIT (OUTPATIENT)
Dept: PHYSICAL THERAPY | Facility: HOSPITAL | Age: 53
End: 2023-11-17
Attending: INTERNAL MEDICINE
Payer: COMMERCIAL

## 2023-11-17 PROCEDURE — 97140 MANUAL THERAPY 1/> REGIONS: CPT

## 2023-11-17 PROCEDURE — 97110 THERAPEUTIC EXERCISES: CPT

## 2023-11-17 NOTE — PROGRESS NOTES
Diagnosis:   Nondisplaced fracture of lateral condyle of left tibia, subsequent encounter for closed fracture with routine healing (D80.276X)         Referring Provider: No ref. provider found  Date of Evaluation:    10/17/23    Precautions:   50% WB on L Next MD visit:   none scheduled  Date of Surgery: 9/29/23   Insurance Primary/Secondary: [place] / N/A     # Auth Visits: 15 per POC            Subjective: Patient reports feeling better overall - about 70% back to normal. Still has some stiffness after prolonged sitting. Walking has been better and he feels that he is limping less. Pain: 0/10      Objective:   AROM: (* denotes performed with pain)   Knee    Flexion: R WNL; L 125 deg   Extension: R 0; L 0      PROM: (* denotes performed with pain)   Knee    Flexion: R 135; L 135   Extension: R 0; L 0      Single leg balance: R 28 sec; L 15 sec    Assessment: Patient demonstrates improved quality of gait and gait speed. He has limited SLS on the L compared to the R and did have mild \"pinching\" with this that improved following manual therapy. Progressed weight bearing exercises but he felt hesitant with forward lunge on 2\" step. Will work on SLS at home.       Goals:   (To be met in 15 visits)   Pt will improve knee extension ROM to 0 deg to allow proper heel strike during gait and terminal knee extension in stance  Pt will improve knee AROM flexion to >120 degrees to improve ability to perform stairs  Pt will improve quad strength to 5/5 to ascend 1 flight of stairs reciprocally without UE assist  Pt will increase hip and knee strength to grossly 4+/5 to be able to get up and down from the floor safely  Pt will demonstrate increased hip ER/ABD strength to 5/5 to perform stepping and squatting activities without excessive femoral IR/ADD  Pt will improve SLS to >10s to improve safety and independence with gait on uneven surfaces such as grass  Pt will be independent and compliant with comprehensive HEP to maintain progress achieved in PT    Plan: continue PT to improve strength, ROM, and function of the LLE  Date: 10/20/2023  TX#: 2/15 Date:  10/27/23               TX#: 3/15 Date:  11/3/23               TX#: 4/15 Date:  11/10/23               TX#: 5/15 Date: 11/13/23  Tx#: 6/15 Date: 11/17/23  Tx#: 7/15   S/l clam, 10; RTB, 10  S/l hip abduction, 2x8  SAQ, 10  SLR, 10; 1#, 10  Seated tibial IR/ER active Standing LAQ, YTB, 2x10  Standing SLR, YTB, 10  Forward lunge onto 8\" step partial WB, 15  Airex weight shift, 20  Gastroc stretch, 3x20 sec  Bridge, 10  HS stretch, 3x30 sec   Stationary bike, lv 2, 5 min  Gait training in // bars, 5 min  Tiltboard AP and ML, 5 min  Lateral band walk, GTB, 3 laps  FSU, 4\" step, 2x10  Standing HF stretch, 5x10 sec  Seated knee extension GTB, 2x12 Stationary bike, lv 2, 5 min  Knee extension/HS stretch on step, 3x10 sec  Lateral band walk, GTB, 2 laps  Retro band walk, GTB, 2 laps  Shuttle SLP, 37#, 2x12  Squat at table, 2x5  Seated knee extension, 25#, 2x10  Prone quad stretch, 3x15 sec Stationary bike, lv 3, 5 min  Shuttle SLP, 37#, 2x12  Bridge, 10; single leg, 8; w/ SB, 8  Squat to table, 2x8  Lunge on step, 6\", 2x10  Seated knee extension, BTB, 2x10  Seated knee flexion, BTB, 10  Airex SLS, 8x5sec Stationary bike, lv 3, 5 min  Forward step to SLS  BOSU squat, 2x8  Seated HS curl, 25#, 2x12  Shuttle SLP, 43#, 2x12  SLS practice  Forward lunge on 2\" step, 2x8     Manual:  Passive knee flexion and extension  Tib/fem AP in 90 deg flexion   Prox tib/fib AP in sitting Manual:  Passive knee flexion and extension  Tib/fem AP in 90 deg flexion  Manual:  Passive knee flexion and extension  Tib/fem AP in 0 deg flexion  Manual:  Passive knee flexion and extension  Tib/fem AP in 5 deg flexion in prone Manual:  Passive knee flexion and extension  Tib/fem AP in 5 deg flexion in prone Manual:  Passive knee flexion and extension  Tib/fem AP and lateral glide 90 deg flexion                   HEP: Seated knee flexion and extension, BTB  Mini squat, 3x5  SLS practice    Charges: man (10 min), 2TE (30 min)      Total Timed Treatment: 40 min  Total Treatment Time: 40 min

## 2023-11-20 ENCOUNTER — OFFICE VISIT (OUTPATIENT)
Dept: PHYSICAL THERAPY | Facility: HOSPITAL | Age: 53
End: 2023-11-20
Attending: INTERNAL MEDICINE
Payer: COMMERCIAL

## 2023-11-20 PROCEDURE — 97110 THERAPEUTIC EXERCISES: CPT

## 2023-11-20 PROCEDURE — 97140 MANUAL THERAPY 1/> REGIONS: CPT

## 2023-11-20 NOTE — PROGRESS NOTES
Diagnosis:   Nondisplaced fracture of lateral condyle of left tibia, subsequent encounter for closed fracture with routine healing (Y89.231Y)         Referring Provider: No ref. provider found  Date of Evaluation:    10/17/23    Precautions:   50% WB on L Next MD visit:   none scheduled  Date of Surgery: 9/29/23   Insurance Primary/Secondary: [employer] [place] P[place] / N/A     # Auth Visits: 15 per POC            Subjective: Patient reports having more discomfort/soreness today. He was out of town over the weekend and did more walking, including stairs, than he had been doing previously. Pain: 0/10 (\"not pain, just discomfort\")      Objective:   AROM: (* denotes performed with pain)   Knee    Flexion: R WNL; L 125 deg   Extension: R 0; L 0      PROM: (* denotes performed with pain)   Knee    Flexion: R 135; L 135   Extension: R 0; L 0      Single leg balance: R 28 sec; L 15 sec    Assessment: Patient presents with increased swelling in the L knee. Did well with closed chain exercises but did have pain with modified RDL. Had the same pain with supine passive HS stretching. Patient advised to ice more over the next 1-2 days and hold on weight bearing exercises until symptoms return to baseline.        Goals:   (To be met in 15 visits)   Pt will improve knee extension ROM to 0 deg to allow proper heel strike during gait and terminal knee extension in stance  Pt will improve knee AROM flexion to >120 degrees to improve ability to perform stairs  Pt will improve quad strength to 5/5 to ascend 1 flight of stairs reciprocally without UE assist  Pt will increase hip and knee strength to grossly 4+/5 to be able to get up and down from the floor safely  Pt will demonstrate increased hip ER/ABD strength to 5/5 to perform stepping and squatting activities without excessive femoral IR/ADD  Pt will improve SLS to >10s to improve safety and independence with gait on uneven surfaces such as grass  Pt will be independent and compliant with comprehensive HEP to maintain progress achieved in PT    Plan: continue PT to improve strength, ROM, and function of the LLE  Date: 10/20/2023  TX#: 2/15 Date:  10/27/23               TX#: 3/15 Date:  11/3/23               TX#: 4/15 Date:  11/10/23               TX#: 5/15 Date: 11/13/23  Tx#: 6/15 Date: 11/17/23  Tx#: 7/15 Date: 11/20/23  Tx#: 8/15   S/l clam, 10; RTB, 10  S/l hip abduction, 2x8  SAQ, 10  SLR, 10; 1#, 10  Seated tibial IR/ER active Standing LAQ, YTB, 2x10  Standing SLR, YTB, 10  Forward lunge onto 8\" step partial WB, 15  Airex weight shift, 20  Gastroc stretch, 3x20 sec  Bridge, 10  HS stretch, 3x30 sec   Stationary bike, lv 2, 5 min  Gait training in // bars, 5 min  Tiltboard AP and ML, 5 min  Lateral band walk, GTB, 3 laps  FSU, 4\" step, 2x10  Standing HF stretch, 5x10 sec  Seated knee extension GTB, 2x12 Stationary bike, lv 2, 5 min  Knee extension/HS stretch on step, 3x10 sec  Lateral band walk, GTB, 2 laps  Retro band walk, GTB, 2 laps  Shuttle SLP, 37#, 2x12  Squat at table, 2x5  Seated knee extension, 25#, 2x10  Prone quad stretch, 3x15 sec Stationary bike, lv 3, 5 min  Shuttle SLP, 37#, 2x12  Bridge, 10; single leg, 8; w/ SB, 8  Squat to table, 2x8  Lunge on step, 6\", 2x10  Seated knee extension, BTB, 2x10  Seated knee flexion, BTB, 10  Airex SLS, 8x5sec Stationary bike, lv 3, 5 min  Forward step to SLS  BOSU squat, 2x8  Seated HS curl, 25#, 2x12  Shuttle SLP, 43#, 2x12  SLS practice  Forward lunge on 2\" step, 2x8   Treadmill walking  RDL in // bars - not tolerated  Slider retro lunge, 2x8  Chair knee flexion stretch  Seated knee extension, GTB, 15  Forward and retro walk, GTB, 2 laps each  Single leg heel raise off step, 2x8   Manual:  Passive knee flexion and extension  Tib/fem AP in 90 deg flexion   Prox tib/fib AP in sitting Manual:  Passive knee flexion and extension  Tib/fem AP in 90 deg flexion  Manual:  Passive knee flexion and extension  Tib/fem AP in 0 deg flexion  Manual:  Passive knee flexion and extension  Tib/fem AP in 5 deg flexion in prone Manual:  Passive knee flexion and extension  Tib/fem AP in 5 deg flexion in prone Manual:  Passive knee flexion and extension  Tib/fem AP and lateral glide 90 deg flexion Manual:  Passive knee flexion and extension  Tib/fem AP and lateral glide 90 deg flexion                     HEP:   Seated knee flexion and extension, BTB  Mini squat, 3x5  SLS practice    Charges: man (10 min), 2TE (30 min)      Total Timed Treatment: 40 min  Total Treatment Time: 40 min

## 2023-11-24 ENCOUNTER — APPOINTMENT (OUTPATIENT)
Dept: PHYSICAL THERAPY | Facility: HOSPITAL | Age: 53
End: 2023-11-24
Attending: INTERNAL MEDICINE
Payer: COMMERCIAL

## 2023-11-27 ENCOUNTER — APPOINTMENT (OUTPATIENT)
Dept: PHYSICAL THERAPY | Facility: HOSPITAL | Age: 53
End: 2023-11-27
Attending: INTERNAL MEDICINE
Payer: COMMERCIAL

## 2023-12-01 ENCOUNTER — APPOINTMENT (OUTPATIENT)
Dept: PHYSICAL THERAPY | Facility: HOSPITAL | Age: 53
End: 2023-12-01
Attending: INTERNAL MEDICINE
Payer: COMMERCIAL

## 2023-12-04 ENCOUNTER — OFFICE VISIT (OUTPATIENT)
Dept: PHYSICAL THERAPY | Facility: HOSPITAL | Age: 53
End: 2023-12-04
Attending: INTERNAL MEDICINE
Payer: COMMERCIAL

## 2023-12-04 PROCEDURE — 97140 MANUAL THERAPY 1/> REGIONS: CPT

## 2023-12-04 PROCEDURE — 97110 THERAPEUTIC EXERCISES: CPT

## 2023-12-04 NOTE — PROGRESS NOTES
Diagnosis:   Nondisplaced fracture of lateral condyle of left tibia, subsequent encounter for closed fracture with routine healing (A32.370O)         Referring Provider: No ref. provider found  Date of Evaluation:    10/17/23    Precautions:   50% WB on L Next MD visit:   none scheduled  Date of Surgery: 9/29/23   Insurance Primary/Secondary: [employer] [place] / N/A     # Auth Visits: 15 per POC            Subjective: Patient reports feeling better overall since previous session. Less sharp pain in the anteromedial knee but still having soreness/discomfort/tightness in the posterolateral knee. Also sometimes still feels instability in the L knee with walking or stairs. Pain: 0/10 (\"not pain, just discomfort\")      Objective:   AROM: (* denotes performed with pain)   Knee    Flexion: R WNL; L 125 deg   Extension: R 0; L 0      PROM: (* denotes performed with pain)   Knee    Flexion: R 135; L 135   Extension: R 0; L 0      Single leg balance: R 28 sec; L 28 sec    Assessment: Patient presents decreased swelling compared to previous session and improved SLS time. He responded well to IASTM of the distal quad and ITB with decreased soreness on knee stretching and with walking. He is going to do self-STM at home until next session.        Goals:   (To be met in 15 visits)   Pt will improve knee extension ROM to 0 deg to allow proper heel strike during gait and terminal knee extension in stance  Pt will improve knee AROM flexion to >120 degrees to improve ability to perform stairs  Pt will improve quad strength to 5/5 to ascend 1 flight of stairs reciprocally without UE assist  Pt will increase hip and knee strength to grossly 4+/5 to be able to get up and down from the floor safely  Pt will demonstrate increased hip ER/ABD strength to 5/5 to perform stepping and squatting activities without excessive femoral IR/ADD  Pt will improve SLS to >10s to improve safety and independence with gait on uneven surfaces such as grass  Pt will be independent and compliant with comprehensive HEP to maintain progress achieved in PT    Plan: continue PT to improve strength, ROM, and function of the LLE  Date: 10/20/2023  TX#: 2/15 Date:  10/27/23               TX#: 3/15 Date:  11/3/23               TX#: 4/15 Date:  11/10/23               TX#: 5/15 Date: 11/13/23  Tx#: 6/15 Date: 11/17/23  Tx#: 7/15 Date: 11/20/23  Tx#: 8/15 Date: 12/4/23  Tx#: 9/15   S/l clam, 10; RTB, 10  S/l hip abduction, 2x8  SAQ, 10  SLR, 10; 1#, 10  Seated tibial IR/ER active Standing LAQ, YTB, 2x10  Standing SLR, YTB, 10  Forward lunge onto 8\" step partial WB, 15  Airex weight shift, 20  Gastroc stretch, 3x20 sec  Bridge, 10  HS stretch, 3x30 sec   Stationary bike, lv 2, 5 min  Gait training in // bars, 5 min  Tiltboard AP and ML, 5 min  Lateral band walk, GTB, 3 laps  FSU, 4\" step, 2x10  Standing HF stretch, 5x10 sec  Seated knee extension GTB, 2x12 Stationary bike, lv 2, 5 min  Knee extension/HS stretch on step, 3x10 sec  Lateral band walk, GTB, 2 laps  Retro band walk, GTB, 2 laps  Shuttle SLP, 37#, 2x12  Squat at table, 2x5  Seated knee extension, 25#, 2x10  Prone quad stretch, 3x15 sec Stationary bike, lv 3, 5 min  Shuttle SLP, 37#, 2x12  Bridge, 10; single leg, 8; w/ SB, 8  Squat to table, 2x8  Lunge on step, 6\", 2x10  Seated knee extension, BTB, 2x10  Seated knee flexion, BTB, 10  Airex SLS, 8x5sec Stationary bike, lv 3, 5 min  Forward step to SLS  BOSU squat, 2x8  Seated HS curl, 25#, 2x12  Shuttle SLP, 43#, 2x12  SLS practice  Forward lunge on 2\" step, 2x8   Treadmill walking  RDL in // bars - not tolerated  Slider retro lunge, 2x8  Chair knee flexion stretch  Seated knee extension, GTB, 15  Forward and retro walk, GTB, 2 laps each  Single leg heel raise off step, 2x8 Shuttle SLP, 62#, 3x12  SLS  ML tiltboard, static and dynamic on // bars, 5 min  BTB lateral step to SLS  BTB ankle eversion w/ lateral step  Supine active HS stretching, 5 min   Manual:  Passive knee flexion and extension  Tib/fem AP in 90 deg flexion   Prox tib/fib AP in sitting Manual:  Passive knee flexion and extension  Tib/fem AP in 90 deg flexion  Manual:  Passive knee flexion and extension  Tib/fem AP in 0 deg flexion  Manual:  Passive knee flexion and extension  Tib/fem AP in 5 deg flexion in prone Manual:  Passive knee flexion and extension  Tib/fem AP in 5 deg flexion in prone Manual:  Passive knee flexion and extension  Tib/fem AP and lateral glide 90 deg flexion Manual:  Passive knee flexion and extension  Tib/fem AP and lateral glide 90 deg flexion Manual:  Tib/fem lateral glide in 90 deg flexion  Distal quad/ITB IASTM                       HEP:   Seated knee flexion and extension, BTB  Mini squat, 3x5  SLS practice    Charges: man (10 min), 2TE (30 min)      Total Timed Treatment: 40 min  Total Treatment Time: 40 min

## 2023-12-08 ENCOUNTER — APPOINTMENT (OUTPATIENT)
Dept: PHYSICAL THERAPY | Facility: HOSPITAL | Age: 53
End: 2023-12-08
Attending: INTERNAL MEDICINE
Payer: COMMERCIAL

## 2023-12-15 ENCOUNTER — OFFICE VISIT (OUTPATIENT)
Dept: PHYSICAL THERAPY | Facility: HOSPITAL | Age: 53
End: 2023-12-15
Attending: INTERNAL MEDICINE
Payer: COMMERCIAL

## 2023-12-15 PROCEDURE — 97140 MANUAL THERAPY 1/> REGIONS: CPT

## 2023-12-15 PROCEDURE — 97110 THERAPEUTIC EXERCISES: CPT

## 2023-12-15 PROCEDURE — 97016 VASOPNEUMATIC DEVICE THERAPY: CPT

## 2023-12-15 NOTE — PROGRESS NOTES
Diagnosis:   Nondisplaced fracture of lateral condyle of left tibia, subsequent encounter for closed fracture with routine healing (U38.270R)         Referring Provider: No ref. provider found  Date of Evaluation:    10/17/23    Precautions:   50% WB on L Next MD visit:   none scheduled  Date of Surgery: 9/29/23   Insurance Primary/Secondary: [employer] [place] / N/A     # Auth Visits: 15 per POC            Subjective: Patient reports having increased swelling and discomfort in the L knee after being out of town and doing a lot of travel and walking with increased weight in his luggage over the last week. Pain: 0/10 (\"not pain, just discomfort\")      Objective:   AROM: (* denotes performed with pain)   Knee    Flexion: R WNL; L 125 deg   Extension: R 0; L 0      PROM: (* denotes performed with pain)   Knee    Flexion: R 135; L 135   Extension: R 0; L 0      Single leg balance: R 28 sec; L 28 sec    Assessment: Patient presents with increased swelling and increased antalgia with gait. He has reproduction of symptoms with rotational movements. This improved following lateral tibiofemoral joint glides. He is going to perform light stretching and seated knee tibial rotation until next session. He is doing well when swelling is decreased but has difficulty with walking and stairs when swelling is increased.        Goals:   (To be met in 15 visits)   Pt will improve knee extension ROM to 0 deg to allow proper heel strike during gait and terminal knee extension in stance  Pt will improve knee AROM flexion to >120 degrees to improve ability to perform stairs  Pt will improve quad strength to 5/5 to ascend 1 flight of stairs reciprocally without UE assist  Pt will increase hip and knee strength to grossly 4+/5 to be able to get up and down from the floor safely  Pt will demonstrate increased hip ER/ABD strength to 5/5 to perform stepping and squatting activities without excessive femoral IR/ADD  Pt will improve SLS to >10s to improve safety and independence with gait on uneven surfaces such as grass  Pt will be independent and compliant with comprehensive HEP to maintain progress achieved in PT    Plan: continue PT to improve strength, ROM, and function of the LLE  Date: 10/20/2023  TX#: 2/15 Date:  10/27/23               TX#: 3/15 Date:  11/3/23               TX#: 4/15 Date:  11/10/23               TX#: 5/15 Date: 11/13/23  Tx#: 6/15 Date: 11/17/23  Tx#: 7/15 Date: 11/20/23  Tx#: 8/15 Date: 12/4/23  Tx#: 9/15 Date: 12/15/23  Tx#: 10/15   S/l clam, 10; RTB, 10  S/l hip abduction, 2x8  SAQ, 10  SLR, 10; 1#, 10  Seated tibial IR/ER active Standing LAQ, YTB, 2x10  Standing SLR, YTB, 10  Forward lunge onto 8\" step partial WB, 15  Airex weight shift, 20  Gastroc stretch, 3x20 sec  Bridge, 10  HS stretch, 3x30 sec   Stationary bike, lv 2, 5 min  Gait training in // bars, 5 min  Tiltboard AP and ML, 5 min  Lateral band walk, GTB, 3 laps  FSU, 4\" step, 2x10  Standing HF stretch, 5x10 sec  Seated knee extension GTB, 2x12 Stationary bike, lv 2, 5 min  Knee extension/HS stretch on step, 3x10 sec  Lateral band walk, GTB, 2 laps  Retro band walk, GTB, 2 laps  Shuttle SLP, 37#, 2x12  Squat at table, 2x5  Seated knee extension, 25#, 2x10  Prone quad stretch, 3x15 sec Stationary bike, lv 3, 5 min  Shuttle SLP, 37#, 2x12  Bridge, 10; single leg, 8; w/ SB, 8  Squat to table, 2x8  Lunge on step, 6\", 2x10  Seated knee extension, BTB, 2x10  Seated knee flexion, BTB, 10  Airex SLS, 8x5sec Stationary bike, lv 3, 5 min  Forward step to SLS  BOSU squat, 2x8  Seated HS curl, 25#, 2x12  Shuttle SLP, 43#, 2x12  SLS practice  Forward lunge on 2\" step, 2x8   Treadmill walking  RDL in // bars - not tolerated  Slider retro lunge, 2x8  Chair knee flexion stretch  Seated knee extension, GTB, 15  Forward and retro walk, GTB, 2 laps each  Single leg heel raise off step, 2x8 Shuttle SLP, 62#, 3x12  SLS  ML tiltboard, static and dynamic on // bars, 5 min  BTB lateral step to SLS  BTB ankle eversion w/ lateral step  Supine active HS stretching, 5 min Standing gastroc stretch, 3x30 sec  Standing RTB hip flexion/knee extension, 2x10  Forward walk w/ RTB knee extension, 2 laps  Seated tibial IR/ER, 30   Manual:  Passive knee flexion and extension  Tib/fem AP in 90 deg flexion   Prox tib/fib AP in sitting Manual:  Passive knee flexion and extension  Tib/fem AP in 90 deg flexion  Manual:  Passive knee flexion and extension  Tib/fem AP in 0 deg flexion  Manual:  Passive knee flexion and extension  Tib/fem AP in 5 deg flexion in prone Manual:  Passive knee flexion and extension  Tib/fem AP in 5 deg flexion in prone Manual:  Passive knee flexion and extension  Tib/fem AP and lateral glide 90 deg flexion Manual:  Passive knee flexion and extension  Tib/fem AP and lateral glide 90 deg flexion Manual:  Tib/fem lateral glide in 90 deg flexion  Distal quad/ITB IASTM Manual:  Tib/fem lateral glide in 90 deg flexion  Prone tibial ER passive                         HEP:   Seated knee flexion and extension, BTB  Mini squat, 3x5  SLS practice    Charges: man (10 min), TE (20 min), game ready (15 min)      Total Timed Treatment: 45 min  Total Treatment Time: 45 min

## 2023-12-21 ENCOUNTER — OFFICE VISIT (OUTPATIENT)
Dept: PHYSICAL THERAPY | Facility: HOSPITAL | Age: 53
End: 2023-12-21
Attending: INTERNAL MEDICINE
Payer: COMMERCIAL

## 2023-12-21 PROCEDURE — 97110 THERAPEUTIC EXERCISES: CPT

## 2023-12-21 NOTE — PROGRESS NOTES
Diagnosis:   Nondisplaced fracture of lateral condyle of left tibia, subsequent encounter for closed fracture with routine healing (Y36.430F)         Referring Provider: No ref. provider found  Date of Evaluation:    10/17/23    Precautions:   50% WB on L Next MD visit:   none scheduled  Date of Surgery: 9/29/23   Insurance Primary/Secondary: [employer] [place] P[place] / N/A     # Auth Visits: 15 per POC            Subjective: Patient reports swelling has decreased since last week but he is still having pain in the lateral knee with stairs and with rotational movements. This is worse after prolonged sitting. Pain: 0/10 (\"not pain, just discomfort\")      Objective:   AROM: (* denotes performed with pain)   Knee    Flexion: R WNL; L 125 deg   Extension: R 0; L 0      PROM: (* denotes performed with pain)   Knee    Flexion: R 135; L 135   Extension: R 0; L 0      Single leg balance: R >30 sec; L >30 sec (increased pain on release)    Knee extension MMT: R 5/5; L 5/5  Knee flexion MMT: R 5/5; L 5-/5    Assessment: Patient presents with decreased swelling compared to previous session. Improved single leg balance but he has pain when stepping off the L leg. Also has pain with single leg motor control exercises on the tiltboard. Tolerated closed chain strengthening with both legs on the ground. Initially had pain with passive end range extension. This improved following manual therapy. Added standing trunk rotation to HEP.        Goals:   (To be met in 15 visits)   Pt will improve knee extension ROM to 0 deg to allow proper heel strike during gait and terminal knee extension in stance  Pt will improve knee AROM flexion to >120 degrees to improve ability to perform stairs  Pt will improve quad strength to 5/5 to ascend 1 flight of stairs reciprocally without UE assist  Pt will increase hip and knee strength to grossly 4+/5 to be able to get up and down from the floor safely  Pt will demonstrate increased hip ER/ABD strength to 5/5 to perform stepping and squatting activities without excessive femoral IR/ADD  Pt will improve SLS to >10s to improve safety and independence with gait on uneven surfaces such as grass  Pt will be independent and compliant with comprehensive HEP to maintain progress achieved in PT    Plan: continue PT to improve strength, ROM, and function of the LLE  Date: 10/20/2023  TX#: 2/15 Date:  10/27/23               TX#: 3/15 Date:  11/3/23               TX#: 4/15 Date:  11/10/23               TX#: 5/15 Date: 11/13/23  Tx#: 6/15 Date: 11/17/23  Tx#: 7/15 Date: 11/20/23  Tx#: 8/15 Date: 12/4/23  Tx#: 9/15 Date: 12/15/23  Tx#: 10/15 Date: 12/21/23  Tx#: 11/15   S/l clam, 10; RTB, 10  S/l hip abduction, 2x8  SAQ, 10  SLR, 10; 1#, 10  Seated tibial IR/ER active Standing LAQ, YTB, 2x10  Standing SLR, YTB, 10  Forward lunge onto 8\" step partial WB, 15  Airex weight shift, 20  Gastroc stretch, 3x20 sec  Bridge, 10  HS stretch, 3x30 sec   Stationary bike, lv 2, 5 min  Gait training in // bars, 5 min  Tiltboard AP and ML, 5 min  Lateral band walk, GTB, 3 laps  FSU, 4\" step, 2x10  Standing HF stretch, 5x10 sec  Seated knee extension GTB, 2x12 Stationary bike, lv 2, 5 min  Knee extension/HS stretch on step, 3x10 sec  Lateral band walk, GTB, 2 laps  Retro band walk, GTB, 2 laps  Shuttle SLP, 37#, 2x12  Squat at table, 2x5  Seated knee extension, 25#, 2x10  Prone quad stretch, 3x15 sec Stationary bike, lv 3, 5 min  Shuttle SLP, 37#, 2x12  Bridge, 10; single leg, 8; w/ SB, 8  Squat to table, 2x8  Lunge on step, 6\", 2x10  Seated knee extension, BTB, 2x10  Seated knee flexion, BTB, 10  Airex SLS, 8x5sec Stationary bike, lv 3, 5 min  Forward step to SLS  BOSU squat, 2x8  Seated HS curl, 25#, 2x12  Shuttle SLP, 43#, 2x12  SLS practice  Forward lunge on 2\" step, 2x8   Treadmill walking  RDL in // bars - not tolerated  Slider retro lunge, 2x8  Chair knee flexion stretch  Seated knee extension, GTB, 15  Forward and retro walk, GTB, 2 laps each  Single leg heel raise off step, 2x8 Shuttle SLP, 62#, 3x12  SLS  ML tiltboard, static and dynamic on // bars, 5 min  BTB lateral step to SLS  BTB ankle eversion w/ lateral step  Supine active HS stretching, 5 min Standing gastroc stretch, 3x30 sec  Standing RTB hip flexion/knee extension, 2x10  Forward walk w/ RTB knee extension, 2 laps  Seated tibial IR/ER, 30 Tiltboard AP, 2'  Tiltboard ML, 2'  Cable chop rotation, 20#, 2x5 keanu  Standing quad stretch, 30 sec  Shuttle SLP, 50#, 3x15  Seated knee extension, 25#, 2x15  Lateral walk pallof press, 20#, 10  Split lunge, partial ROM, 2x8   Manual:  Passive knee flexion and extension  Tib/fem AP in 90 deg flexion   Prox tib/fib AP in sitting Manual:  Passive knee flexion and extension  Tib/fem AP in 90 deg flexion  Manual:  Passive knee flexion and extension  Tib/fem AP in 0 deg flexion  Manual:  Passive knee flexion and extension  Tib/fem AP in 5 deg flexion in prone Manual:  Passive knee flexion and extension  Tib/fem AP in 5 deg flexion in prone Manual:  Passive knee flexion and extension  Tib/fem AP and lateral glide 90 deg flexion Manual:  Passive knee flexion and extension  Tib/fem AP and lateral glide 90 deg flexion Manual:  Tib/fem lateral glide in 90 deg flexion  Distal quad/ITB IASTM Manual:  Tib/fem lateral glide in 90 deg flexion  Prone tibial ER passive Manual:  Tib/fem lateral glide in 5 deg flexion                           HEP:   Seated knee flexion and extension, BTB  Recumbent bike  Standing trunk rotation, cable    Charges: 0 man (5 min), 3TE (40 min)   Total Timed Treatment: 45 min  Total Treatment Time: 45 min

## 2023-12-28 ENCOUNTER — OFFICE VISIT (OUTPATIENT)
Dept: PHYSICAL THERAPY | Facility: HOSPITAL | Age: 53
End: 2023-12-28
Attending: INTERNAL MEDICINE
Payer: COMMERCIAL

## 2023-12-28 PROCEDURE — 97110 THERAPEUTIC EXERCISES: CPT

## 2023-12-28 PROCEDURE — 97140 MANUAL THERAPY 1/> REGIONS: CPT

## 2023-12-28 NOTE — PROGRESS NOTES
Diagnosis:   Nondisplaced fracture of lateral condyle of left tibia, subsequent encounter for closed fracture with routine healing (O12.393Q)         Referring Provider: No ref. provider found  Date of Evaluation:    10/17/23    Precautions:   50% WB on L Next MD visit:   none scheduled  Date of Surgery: 9/29/23   Insurance Primary/Secondary: [place] / N/A     # Auth Visits: 15 per POC            Subjective: Patient reports slightly improved symptoms compared to last week. Still getting posterolateral pain with stairs but was able to ascend stairs once yesterday without pain. Also having pinching pain in the anteromedial knee with standing extension. Pain: 0/10 (\"not pain, just discomfort\")      Objective:   AROM: (* denotes performed with pain)   Knee    Flexion: R WNL; L 125 deg   Extension: R 0; L 0      PROM: (* denotes performed with pain)   Knee    Flexion: R 135; L 135   Extension: R 0; L 0      Single leg balance: R >30 sec; L >30 sec (increased pain on release)    Knee extension MMT: R 5/5; L 5/5  Knee flexion MMT: R 5/5; L 5-/5    Assessment: Patient has improved tolerance for active and passive extension compared to previous session. Still having increased pain with single leg loading. This improved following lateral glide mobilization with movement in standing. He has a follow up with his surgeon today. Plan to follow up after that visit to determine need for continued PT.        Goals:   (To be met in 15 visits)   Pt will improve knee extension ROM to 0 deg to allow proper heel strike during gait and terminal knee extension in stance  Pt will improve knee AROM flexion to >120 degrees to improve ability to perform stairs  Pt will improve quad strength to 5/5 to ascend 1 flight of stairs reciprocally without UE assist  Pt will increase hip and knee strength to grossly 4+/5 to be able to get up and down from the floor safely  Pt will demonstrate increased hip ER/ABD strength to 5/5 to perform stepping and squatting activities without excessive femoral IR/ADD  Pt will improve SLS to >10s to improve safety and independence with gait on uneven surfaces such as grass  Pt will be independent and compliant with comprehensive HEP to maintain progress achieved in PT    Plan: continue PT to improve strength, ROM, and function of the LLE  Date: 10/20/2023  TX#: 2/15 Date:  10/27/23               TX#: 3/15 Date:  11/3/23               TX#: 4/15 Date:  11/10/23               TX#: 5/15 Date: 11/13/23  Tx#: 6/15 Date: 11/17/23  Tx#: 7/15 Date: 11/20/23  Tx#: 8/15 Date: 12/4/23  Tx#: 9/15 Date: 12/15/23  Tx#: 10/15 Date: 12/21/23  Tx#: 11/15 Date: 12/28/23  Tx#: 12/15   S/l clam, 10; RTB, 10  S/l hip abduction, 2x8  SAQ, 10  SLR, 10; 1#, 10  Seated tibial IR/ER active Standing LAQ, YTB, 2x10  Standing SLR, YTB, 10  Forward lunge onto 8\" step partial WB, 15  Airex weight shift, 20  Gastroc stretch, 3x20 sec  Bridge, 10  HS stretch, 3x30 sec   Stationary bike, lv 2, 5 min  Gait training in // bars, 5 min  Tiltboard AP and ML, 5 min  Lateral band walk, GTB, 3 laps  FSU, 4\" step, 2x10  Standing HF stretch, 5x10 sec  Seated knee extension GTB, 2x12 Stationary bike, lv 2, 5 min  Knee extension/HS stretch on step, 3x10 sec  Lateral band walk, GTB, 2 laps  Retro band walk, GTB, 2 laps  Shuttle SLP, 37#, 2x12  Squat at table, 2x5  Seated knee extension, 25#, 2x10  Prone quad stretch, 3x15 sec Stationary bike, lv 3, 5 min  Shuttle SLP, 37#, 2x12  Bridge, 10; single leg, 8; w/ SB, 8  Squat to table, 2x8  Lunge on step, 6\", 2x10  Seated knee extension, BTB, 2x10  Seated knee flexion, BTB, 10  Airex SLS, 8x5sec Stationary bike, lv 3, 5 min  Forward step to SLS  BOSU squat, 2x8  Seated HS curl, 25#, 2x12  Shuttle SLP, 43#, 2x12  SLS practice  Forward lunge on 2\" step, 2x8   Treadmill walking  RDL in // bars - not tolerated  Slider retro lunge, 2x8  Chair knee flexion stretch  Seated knee extension, GTB, 15  Forward and retro walk, GTB, 2 laps each  Single leg heel raise off step, 2x8 Shuttle SLP, 62#, 3x12  SLS  ML tiltboard, static and dynamic on // bars, 5 min  BTB lateral step to SLS  BTB ankle eversion w/ lateral step  Supine active HS stretching, 5 min Standing gastroc stretch, 3x30 sec  Standing RTB hip flexion/knee extension, 2x10  Forward walk w/ RTB knee extension, 2 laps  Seated tibial IR/ER, 30 Tiltboard AP, 2'  Tiltboard ML, 2'  Cable chop rotation, 20#, 2x5 keanu  Standing quad stretch, 30 sec  Shuttle SLP, 50#, 3x15  Seated knee extension, 25#, 2x15  Lateral walk pallof press, 20#, 10  Split lunge, partial ROM, 2x8 TRX squat, 2x12  HS stretch on step, 30 sec  Single leg bridge, 2x8  Cable chop rotation, 25#, 2x15 keanu  Rotational step up, 6\" step, 12  Slider lateral lunge - not tolerated  Indian squat, 10  Prone quad stretch   Manual:  Passive knee flexion and extension  Tib/fem AP in 90 deg flexion   Prox tib/fib AP in sitting Manual:  Passive knee flexion and extension  Tib/fem AP in 90 deg flexion  Manual:  Passive knee flexion and extension  Tib/fem AP in 0 deg flexion  Manual:  Passive knee flexion and extension  Tib/fem AP in 5 deg flexion in prone Manual:  Passive knee flexion and extension  Tib/fem AP in 5 deg flexion in prone Manual:  Passive knee flexion and extension  Tib/fem AP and lateral glide 90 deg flexion Manual:  Passive knee flexion and extension  Tib/fem AP and lateral glide 90 deg flexion Manual:  Tib/fem lateral glide in 90 deg flexion  Distal quad/ITB IASTM Manual:  Tib/fem lateral glide in 90 deg flexion  Prone tibial ER passive Manual:  Tib/fem lateral glide in 5 deg flexion Manual:  Quad STM, 5 min  Lateral glide mob w/ movement in standing, 5 min                             HEP:   Seated knee flexion and extension, BTB  Recumbent bike  Standing trunk rotation, cable    Charges: 1 man (10 min), 2TE (35 min)   Total Timed Treatment: 45 min  Total Treatment Time: 45 min

## 2024-02-13 RX ORDER — GABAPENTIN 300 MG/1
300 CAPSULE ORAL
COMMUNITY
Start: 2023-10-02

## 2024-02-13 RX ORDER — ACETAMINOPHEN 500 MG
TABLET ORAL
COMMUNITY

## 2024-02-13 RX ORDER — ONDANSETRON 4 MG/1
TABLET, FILM COATED ORAL
COMMUNITY
Start: 2023-09-21

## 2024-02-13 RX ORDER — BUDESONIDE 0.5 MG/2ML
0.5 INHALANT ORAL 2 TIMES DAILY
COMMUNITY
Start: 2023-02-23

## 2024-02-13 RX ORDER — HYDROCODONE BITARTRATE AND ACETAMINOPHEN 5; 325 MG/1; MG/1
TABLET ORAL
COMMUNITY
Start: 2023-10-02

## 2024-02-13 RX ORDER — MEPOLIZUMAB 100 MG/ML
INJECTION, SOLUTION SUBCUTANEOUS
COMMUNITY
Start: 2023-12-26

## 2024-02-13 RX ORDER — ASPIRIN 81 MG/1
TABLET ORAL
COMMUNITY
Start: 2023-09-20

## 2024-02-14 ENCOUNTER — OFFICE VISIT (OUTPATIENT)
Dept: FAMILY MEDICINE CLINIC | Facility: CLINIC | Age: 54
End: 2024-02-14
Payer: COMMERCIAL

## 2024-02-14 VITALS
WEIGHT: 251 LBS | OXYGEN SATURATION: 97 % | HEIGHT: 73 IN | BODY MASS INDEX: 33.27 KG/M2 | RESPIRATION RATE: 20 BRPM | SYSTOLIC BLOOD PRESSURE: 122 MMHG | DIASTOLIC BLOOD PRESSURE: 74 MMHG | HEART RATE: 65 BPM

## 2024-02-14 DIAGNOSIS — Z12.11 SCREENING FOR MALIGNANT NEOPLASM OF COLON: ICD-10-CM

## 2024-02-14 DIAGNOSIS — Z13.89 SCREENING FOR GENITOURINARY CONDITION: ICD-10-CM

## 2024-02-14 DIAGNOSIS — Z12.83 SKIN EXAM, SCREENING FOR CANCER: ICD-10-CM

## 2024-02-14 DIAGNOSIS — Z13.0 SCREENING FOR ENDOCRINE, METABOLIC AND IMMUNITY DISORDER: ICD-10-CM

## 2024-02-14 DIAGNOSIS — Z13.29 SCREENING FOR ENDOCRINE, METABOLIC AND IMMUNITY DISORDER: ICD-10-CM

## 2024-02-14 DIAGNOSIS — Z13.228 SCREENING FOR ENDOCRINE, METABOLIC AND IMMUNITY DISORDER: ICD-10-CM

## 2024-02-14 DIAGNOSIS — Z00.00 ROUTINE GENERAL MEDICAL EXAMINATION AT A HEALTH CARE FACILITY: Primary | ICD-10-CM

## 2024-02-14 DIAGNOSIS — Z23 NEED FOR VACCINATION: ICD-10-CM

## 2024-02-14 DIAGNOSIS — Z00.00 LABORATORY EXAMINATION ORDERED AS PART OF A ROUTINE GENERAL MEDICAL EXAMINATION: ICD-10-CM

## 2024-02-14 DIAGNOSIS — G47.33 OSA ON CPAP: ICD-10-CM

## 2024-02-14 DIAGNOSIS — J82.83 EOSINOPHILIC ASTHMA (HCC): ICD-10-CM

## 2024-02-14 DIAGNOSIS — L82.1 SK (SEBORRHEIC KERATOSIS): ICD-10-CM

## 2024-02-14 LAB
ALBUMIN SERPL-MCNC: 3.9 G/DL (ref 3.4–5)
ALBUMIN/GLOB SERPL: 1.1 {RATIO} (ref 1–2)
ALP LIVER SERPL-CCNC: 105 U/L
ALT SERPL-CCNC: 210 U/L
ANION GAP SERPL CALC-SCNC: 3 MMOL/L (ref 0–18)
AST SERPL-CCNC: 95 U/L (ref 15–37)
BASOPHILS # BLD AUTO: 0.02 X10(3) UL (ref 0–0.2)
BASOPHILS NFR BLD AUTO: 0.3 %
BILIRUB SERPL-MCNC: 0.7 MG/DL (ref 0.1–2)
BILIRUB UR QL STRIP.AUTO: NEGATIVE
BUN BLD-MCNC: 13 MG/DL (ref 9–23)
CALCIUM BLD-MCNC: 9.4 MG/DL (ref 8.5–10.1)
CHLORIDE SERPL-SCNC: 107 MMOL/L (ref 98–112)
CHOLEST SERPL-MCNC: 231 MG/DL (ref ?–200)
CLARITY UR REFRACT.AUTO: CLEAR
CO2 SERPL-SCNC: 28 MMOL/L (ref 21–32)
COLOR UR AUTO: YELLOW
CREAT BLD-MCNC: 1.2 MG/DL
EGFRCR SERPLBLD CKD-EPI 2021: 72 ML/MIN/1.73M2 (ref 60–?)
EOSINOPHIL # BLD AUTO: 0.08 X10(3) UL (ref 0–0.7)
EOSINOPHIL NFR BLD AUTO: 1.3 %
ERYTHROCYTE [DISTWIDTH] IN BLOOD BY AUTOMATED COUNT: 13.8 %
EST. AVERAGE GLUCOSE BLD GHB EST-MCNC: 114 MG/DL (ref 68–126)
FASTING PATIENT LIPID ANSWER: YES
FASTING STATUS PATIENT QL REPORTED: YES
GLOBULIN PLAS-MCNC: 3.7 G/DL (ref 2.8–4.4)
GLUCOSE BLD-MCNC: 106 MG/DL (ref 70–99)
GLUCOSE UR STRIP.AUTO-MCNC: NORMAL MG/DL
HAV AB SER QL IA: NONREACTIVE
HBA1C MFR BLD: 5.6 % (ref ?–5.7)
HBV SURFACE AB SER QL: NONREACTIVE
HBV SURFACE AB SERPL IA-ACNC: 3.29 MIU/ML
HCT VFR BLD AUTO: 47.6 %
HDLC SERPL-MCNC: 48 MG/DL (ref 40–59)
HGB BLD-MCNC: 15.9 G/DL
IMM GRANULOCYTES # BLD AUTO: 0.02 X10(3) UL (ref 0–1)
IMM GRANULOCYTES NFR BLD: 0.3 %
KETONES UR STRIP.AUTO-MCNC: NEGATIVE MG/DL
LDLC SERPL CALC-MCNC: 158 MG/DL (ref ?–100)
LEUKOCYTE ESTERASE UR QL STRIP.AUTO: NEGATIVE
LYMPHOCYTES # BLD AUTO: 2.09 X10(3) UL (ref 1–4)
LYMPHOCYTES NFR BLD AUTO: 34.7 %
MCH RBC QN AUTO: 29 PG (ref 26–34)
MCHC RBC AUTO-ENTMCNC: 33.4 G/DL (ref 31–37)
MCV RBC AUTO: 86.7 FL
MONOCYTES # BLD AUTO: 0.48 X10(3) UL (ref 0.1–1)
MONOCYTES NFR BLD AUTO: 8 %
NEUTROPHILS # BLD AUTO: 3.33 X10 (3) UL (ref 1.5–7.7)
NEUTROPHILS # BLD AUTO: 3.33 X10(3) UL (ref 1.5–7.7)
NEUTROPHILS NFR BLD AUTO: 55.4 %
NITRITE UR QL STRIP.AUTO: NEGATIVE
NONHDLC SERPL-MCNC: 183 MG/DL (ref ?–130)
OSMOLALITY SERPL CALC.SUM OF ELEC: 287 MOSM/KG (ref 275–295)
PH UR STRIP.AUTO: 6 [PH] (ref 5–8)
PLATELET # BLD AUTO: 225 10(3)UL (ref 150–450)
POTASSIUM SERPL-SCNC: 4.3 MMOL/L (ref 3.5–5.1)
PROT SERPL-MCNC: 7.6 G/DL (ref 6.4–8.2)
PROT UR STRIP.AUTO-MCNC: NEGATIVE MG/DL
PSA SERPL-MCNC: 0.68 NG/ML (ref ?–4)
RBC # BLD AUTO: 5.49 X10(6)UL
RBC UR QL AUTO: NEGATIVE
SODIUM SERPL-SCNC: 138 MMOL/L (ref 136–145)
SP GR UR STRIP.AUTO: 1.02 (ref 1–1.03)
TRIGL SERPL-MCNC: 138 MG/DL (ref 30–149)
TSI SER-ACNC: 2.29 MIU/ML (ref 0.36–3.74)
UROBILINOGEN UR STRIP.AUTO-MCNC: NORMAL MG/DL
VIT D+METAB SERPL-MCNC: 16.2 NG/ML (ref 30–100)
VLDLC SERPL CALC-MCNC: 27 MG/DL (ref 0–30)
WBC # BLD AUTO: 6 X10(3) UL (ref 4–11)

## 2024-02-14 PROCEDURE — 90472 IMMUNIZATION ADMIN EACH ADD: CPT | Performed by: FAMILY MEDICINE

## 2024-02-14 PROCEDURE — 86765 RUBEOLA ANTIBODY: CPT | Performed by: FAMILY MEDICINE

## 2024-02-14 PROCEDURE — 90471 IMMUNIZATION ADMIN: CPT | Performed by: FAMILY MEDICINE

## 2024-02-14 PROCEDURE — 84153 ASSAY OF PSA TOTAL: CPT | Performed by: FAMILY MEDICINE

## 2024-02-14 PROCEDURE — 80053 COMPREHEN METABOLIC PANEL: CPT | Performed by: FAMILY MEDICINE

## 2024-02-14 PROCEDURE — 82306 VITAMIN D 25 HYDROXY: CPT | Performed by: FAMILY MEDICINE

## 2024-02-14 PROCEDURE — 90750 HZV VACC RECOMBINANT IM: CPT | Performed by: FAMILY MEDICINE

## 2024-02-14 PROCEDURE — 80061 LIPID PANEL: CPT | Performed by: FAMILY MEDICINE

## 2024-02-14 PROCEDURE — 83036 HEMOGLOBIN GLYCOSYLATED A1C: CPT | Performed by: FAMILY MEDICINE

## 2024-02-14 PROCEDURE — 86708 HEPATITIS A ANTIBODY: CPT | Performed by: FAMILY MEDICINE

## 2024-02-14 PROCEDURE — 90686 IIV4 VACC NO PRSV 0.5 ML IM: CPT | Performed by: FAMILY MEDICINE

## 2024-02-14 PROCEDURE — 85025 COMPLETE CBC W/AUTO DIFF WBC: CPT | Performed by: FAMILY MEDICINE

## 2024-02-14 PROCEDURE — 86735 MUMPS ANTIBODY: CPT | Performed by: FAMILY MEDICINE

## 2024-02-14 PROCEDURE — 90715 TDAP VACCINE 7 YRS/> IM: CPT | Performed by: FAMILY MEDICINE

## 2024-02-14 PROCEDURE — 81003 URINALYSIS AUTO W/O SCOPE: CPT | Performed by: FAMILY MEDICINE

## 2024-02-14 PROCEDURE — 86706 HEP B SURFACE ANTIBODY: CPT | Performed by: FAMILY MEDICINE

## 2024-02-14 PROCEDURE — 84443 ASSAY THYROID STIM HORMONE: CPT | Performed by: FAMILY MEDICINE

## 2024-02-14 PROCEDURE — 99396 PREV VISIT EST AGE 40-64: CPT | Performed by: FAMILY MEDICINE

## 2024-02-14 RX ORDER — LORATADINE 10 MG/1
10 TABLET ORAL DAILY
COMMUNITY

## 2024-02-14 RX ORDER — ALBUTEROL SULFATE 90 UG/1
2 AEROSOL, METERED RESPIRATORY (INHALATION) EVERY 6 HOURS PRN
Qty: 1 EACH | Refills: 0 | Status: SHIPPED | OUTPATIENT
Start: 2024-02-14

## 2024-02-14 NOTE — H&P
Dariel Bray is a 53 year old male who presents for a complete physical exam.   HPI:   Pt complains of left knee pain since injury 8/17/23 and surgery 9/29/24.  Travels a lot for work and weight is up.    Wt Readings from Last 6 Encounters:   02/14/24 251 lb (113.9 kg)   01/12/23 240 lb (108.9 kg)   04/04/22 232 lb (105.2 kg)   11/24/21 237 lb 12.8 oz (107.9 kg)   11/12/21 236 lb 3.2 oz (107.1 kg)   10/08/21 230 lb 9.6 oz (104.6 kg)     Body mass index is 33.12 kg/m².     Results for orders placed or performed during the hospital encounter of 01/12/23   Rapid SARS-CoV-2 by PCR    Specimen: Nares; Other   Result Value Ref Range    Rapid SARS-CoV-2 by PCR Not Detected Not Detected         Current Outpatient Medications   Medication Sig Dispense Refill    loratadine 10 MG Oral Tab Take 1 tablet (10 mg total) by mouth daily.      acetaminophen 500 MG Oral Tab Take by mouth.      aspirin 81 MG Oral Tab EC       budesonide 0.5 MG/2ML Inhalation Suspension Inhale 2 mL (0.5 mg total) into the lungs 2 (two) times daily.      NUCALA 100 MG/ML Subcutaneous Solution Auto-injector       SYMBICORT 160-4.5 MCG/ACT Inhalation Aerosol INHALE 2 PUFFS TWICE A DAY *RINSE MOUTH AFTER USE*      Azelastine HCl 0.1 % Nasal Solution 2 sprays by Nasal route 2 (two) times daily.      Fluticasone Propionate 50 MCG/ACT Nasal Suspension 2 sprays by Each Nare route daily. 3 Bottle 3    gabapentin 300 MG Oral Cap Take 1 capsule (300 mg total) by mouth. (Patient not taking: Reported on 2/14/2024)      HYDROcodone-acetaminophen 5-325 MG Oral Tab TAKE 1 TABLET BY MOUTH EVERY 6 HOURS FOR UP TO 5 DAYS AS NEEDED FOR SEVERE PAIN (Patient not taking: Reported on 2/14/2024)      ondansetron (ZOFRAN) 4 mg tablet  (Patient not taking: Reported on 2/14/2024)      methylPREDNISolone (MEDROL) 4 MG Oral Tablet Therapy Pack Dosepack: take as directed (Patient not taking: Reported on 2/14/2024) 1 each 0    benzonatate 100 MG Oral Cap Take 1-2 capsules  (100-200 mg total) by mouth nightly as needed for cough. (Patient not taking: Reported on 2/14/2024) 20 capsule 0    azithromycin (ZITHROMAX Z-LEANNE) 250 MG Oral Tab Take two tablets today, then one tablet daily for 4 more days (Patient not taking: Reported on 1/19/2023) 6 tablet 0    traMADol 50 MG Oral Tab Take 1 tablet (50 mg total) by mouth every 8 (eight) hours as needed for Pain. (Patient not taking: Reported on 1/19/2023) 60 tablet 0    levoFLOXacin 500 MG Oral Tab Take 1 tablet (500 mg total) by mouth daily. (Patient not taking: Reported on 1/19/2023) 10 tablet 0    tamsulosin (FLOMAX) cap  (Patient not taking: Reported on 1/19/2023)      tiZANidine 2 MG Oral Tab Take 1 tablet (2 mg total) by mouth every 8 (eight) hours as needed. (Patient not taking: Reported on 1/19/2023) 30 tablet 1      Allergies   Allergen Reactions    Pcn [Bicillin L-A]      Pt not allergic. Parents were      Past Medical History:   Diagnosis Date    AR (allergic rhinitis)     Esophageal ring Dilated 9/13    HIGH CHOLESTEROL     not on medication    Hyperlipidemia     Kidney stone     ALVAREZ (nonalcoholic steatohepatitis)     Plantar fasciitis     Sleep apnea     Unspecified sleep apnea EDWARD DX 11-19-12 [place] 1-22-13    AHI 23.7 SaO2 87% CPAP 8 [facility]      Past Surgical History:   Procedure Laterality Date    LITHOTRIPSY      OTHER SURGICAL HISTORY      foot    OTHER SURGICAL HISTORY      lasik    OTHER SURGICAL HISTORY      acl    OTHER SURGICAL HISTORY      wrist,right    OTHER SURGICAL HISTORY  10/22/13    Cystoscopy - Dr. Devi     [facility] GI ENDOSCOPY,CHIQUITA W GUIDE  9/24/13= Esophageal ring    No Jaimes's      Family History   Problem Relation Age of Onset    Diabetes Other     Other (Other) Father         copd    Diabetes Mother       Social History:  Social History     Socioeconomic History    Marital status:     Number of children: 3   Occupational History    Occupation: sales   Tobacco Use    Smoking status: Never     Smokeless tobacco: Never   Vaping Use    Vaping Use: Never used   Substance and Sexual Activity    Alcohol use: No    Drug use: No      Occ: . : y. Children: 3.   Exercise:  4 times per week. bike Diet: watches minimally     REVIEW OF SYSTEMS:   GENERAL: feels well otherwise  SKIN: denies any unusual skin lesions  EYES:denies blurred vision or double vision  HEENT: denies nasal congestion, sinus pain or ST  LUNGS: denies shortness of breath with exertion  CARDIOVASCULAR: denies chest pain on exertion  GI: denies abdominal pain,denies heartburn  : denies nocturia or changes in stream  MUSCULOSKELETAL: denies back pain  NEURO: denies headaches  PSYCHE: denies depression or anxiety  HEMATOLOGIC: denies hx of anemia  ENDOCRINE: denies thyroid history  ALL/ASTHMA: denies hx of allergy or asthma    EXAM:   /74 (BP Location: Left arm, Patient Position: Sitting, Cuff Size: large)   Pulse 65   Resp 20   Ht 6' 1\" (1.854 m)   Wt 251 lb (113.9 kg)   SpO2 97%   BMI 33.12 kg/m²   Body mass index is 33.12 kg/m².   GENERAL: well developed, well nourished,in no apparent distress  SKIN: no rashes,no suspicious lesions  HEENT: atraumatic, normocephalic,ears and throat are clear  EYES:  EOMI, conjunctiva are clear  NECK: supple,no adenopathy,no bruits, no thyromegaly  CHEST: no chest tenderness  BREAST: no dominant or suspicious mass  LUNGS: clear to auscultation  CARDIO: RRR without murmur  GI: good BS's,no masses, HSM or tenderness  : Geena YUSUF MA present.  two descended testes,no masses,no hernia,no penile lesions  RECTAL: good rectal tone, prostate shows no masses  MUSCULOSKELETAL: back is not tender,FROM of the back  EXTREMITIES: no cyanosis, clubbing or edema  NEURO: Oriented times three,cranial nerves are intact,motor and sensory are grossly intact; 2 + knee reflexes bilaterally  VASCULAR: 2 + dorsalis pedal pulses bilaterally    ASSESSMENT AND PLAN:   Dariel Bray is a 53 year old male  who presents for a complete physical exam.    Pt's weight is Body mass index is 33.12 kg/m²., recommended low fat diet and aerobic exercise 30 minutes three times weekly.   Health maintenance, will check:   Orders Placed This Encounter   Procedures    CBC W Differential W Platelet [E]    Comp Metabolic Panel (14) [E]    Lipid Panel [E]    TSH W Reflex To Free T4 [E]    Hemoglobin A1C [E]    PSA, Total W Reflex To Free [E]    Urinalysis, Routine [E]    Vitamin D [E]    Mumps Antibodies, IGG-Immunity    Rubeola(Measles)Antibodies, IGG-Immunity [E]    MEASLES AB IGG,IGM [95097] [Q]    MUMPS ANTIBODY PANEL (IGG + IGM) [37022] [Q]    Hepatitis B Surface Antibody [E]    Hep A AB, Total    Vitamin D, 25-Hydroxy    Rubeola(Measles)Antibodies, IGG-Immunity    Rubeola(Measles) Antibodies, IgM    TdaP (Adacel, Boostrix) [92024]    Zoster Recombinant Adjuvanted (Shingrix -Shingles) [84919]       Pt referred for screening colonoscopy.   [facility] 2021 score of 0   HCV 2007 negative    Last eye exam: 9/2023  Last dental exam: due  Advised skin exam.    Encounter Diagnoses   Name Primary?    Routine general medical examination at a health care facility Yes    Laboratory examination ordered as part of a routine general medical examination     Screening for endocrine, metabolic and immunity disorder     Screening for genitourinary condition     Need for vaccination     Screening for malignant neoplasm of colon     Eosinophilic asthma     JP on CPAP     Skin exam, screening for cancer     SK (seborrheic keratosis)        Orders Placed This Encounter   Procedures    CBC W Differential W Platelet [E]    Comp Metabolic Panel (14) [E]    Lipid Panel [E]    TSH W Reflex To Free T4 [E]    Hemoglobin A1C [E]    PSA, Total W Reflex To Free [E]    Urinalysis, Routine [E]    Vitamin D [E]    Mumps Antibodies, IGG-Immunity    Rubeola(Measles)Antibodies, IGG-Immunity [E]    MEASLES AB IGG,IGM [03511] [Q]    MUMPS ANTIBODY PANEL (IGG + IGM) [43897]  [Q]    Hepatitis B Surface Antibody [E]    Hep A AB, Total    Vitamin D, 25-Hydroxy    Rubeola(Measles)Antibodies, IGG-Immunity    Rubeola(Measles) Antibodies, IgM    TdaP (Adacel, Boostrix) [12475]    Zoster Recombinant Adjuvanted (Shingrix -Shingles) [61388]       Meds & Refills for this Visit:  Requested Prescriptions      No prescriptions requested or ordered in this encounter       Imaging & Consults:  TETANUS, DIPHTHERIA TOXOIDS AND ACELLULAR PERTUSIS VACCINE (TDAP), >7 YEARS, IM USE  ZOSTER VACC RECOMBINANT IM NJX  GASTRO - INTERNAL  PULMONARY - INTERNAL  DERM - INTERNAL    Advised claritin 10 mg daily not prn.      The patient indicates understanding of these issues and agrees to the plan.  The patient is asked to return in Return in about 4 weeks (around 3/13/2024) for OM -15.  .

## 2024-02-16 LAB
MEV IGG SER-ACNC: >300 AU/ML (ref 16.5–?)
MUV IGG SER IA-ACNC: 82.1 AU/ML (ref 11–?)

## 2024-03-14 ENCOUNTER — OFFICE VISIT (OUTPATIENT)
Dept: FAMILY MEDICINE CLINIC | Facility: CLINIC | Age: 54
End: 2024-03-14
Payer: COMMERCIAL

## 2024-03-14 VITALS
SYSTOLIC BLOOD PRESSURE: 120 MMHG | BODY MASS INDEX: 32.07 KG/M2 | RESPIRATION RATE: 18 BRPM | OXYGEN SATURATION: 97 % | HEIGHT: 73 IN | HEART RATE: 72 BPM | WEIGHT: 242 LBS | DIASTOLIC BLOOD PRESSURE: 72 MMHG

## 2024-03-14 DIAGNOSIS — E66.09 CLASS 1 OBESITY DUE TO EXCESS CALORIES WITHOUT SERIOUS COMORBIDITY WITH BODY MASS INDEX (BMI) OF 31.0 TO 31.9 IN ADULT: Primary | ICD-10-CM

## 2024-03-14 DIAGNOSIS — Z71.3 DIETARY COUNSELING: ICD-10-CM

## 2024-03-14 DIAGNOSIS — G47.33 OSA ON CPAP: ICD-10-CM

## 2024-03-14 DIAGNOSIS — K62.89 ANAL IRRITATION: ICD-10-CM

## 2024-03-14 DIAGNOSIS — L82.1 SK (SEBORRHEIC KERATOSIS): ICD-10-CM

## 2024-03-14 DIAGNOSIS — Z71.82 EXERCISE COUNSELING: ICD-10-CM

## 2024-03-14 PROCEDURE — 99214 OFFICE O/P EST MOD 30 MIN: CPT | Performed by: FAMILY MEDICINE

## 2024-03-14 NOTE — PROGRESS NOTES
[facility], [address]  [facility] [address]  Dept: [phone]       Patient:  Dariel Bray  :      1970  MRN:      [id number]    Chief Complaint:    Chief Complaint   Patient presents with    Weight Check       SUBJECTIVE     History of Present Illness:  Dariel is being seen today for a follow-up for weight.  Travels a lot.  Cut back on liquor.  Had sushi 2 times last week in [place].  Had trout.  Trying smaller fish.  Working out.  Pt. Complains of a lesion on the right cheek.  Pt .complaisn of having blood from his anus and itching.  He does wipe hard.  He believe he has hemorrhoids.        Past Medical History:   Past Medical History:   Diagnosis Date    AR (allergic rhinitis)     Esophageal ring Dilated     HIGH CHOLESTEROL     not on medication    Hyperlipidemia     Kidney stone     ALVAREZ (nonalcoholic steatohepatitis)     Plantar fasciitis     Sleep apnea     Unspecified sleep apnea EDWARD DX 12 TX 13    AHI 23.7 SaO2 87% CPAP 8 [facility]            OBJECTIVE     Vitals: /72 (BP Location: Right arm, Patient Position: Sitting, Cuff Size: adult)   Pulse 72   Resp 18   Ht 6' 1\" (1.854 m)   Wt 242 lb (109.8 kg)   SpO2 97%   BMI 31.93 kg/m²     Initial weight loss: 9   Total weight loss: 9    Start weight: 251    Wt Readings from Last 3 Encounters:   24 242 lb (109.8 kg)   24 251 lb (113.9 kg)   23 240 lb (108.9 kg)       Patient Medications:    Current Outpatient Medications   Medication Sig Dispense Refill    PEG 3350-KCl-Na Bicarb-NaCl 420 g Oral Recon Soln Take as directed by physician. 4000 mL 0    loratadine 10 MG Oral Tab Take 1 tablet (10 mg total) by mouth daily.      NUCALA 100 MG/ML Subcutaneous Solution Auto-injector       SYMBICORT 160-4.5 MCG/ACT Inhalation Aerosol INHALE 2 PUFFS TWICE A DAY *RINSE MOUTH AFTER USE*      Azelastine HCl 0.1 % Nasal Solution 2  sprays by Nasal route 2 (two) times daily.      Fluticasone Propionate 50 MCG/ACT Nasal Suspension 2 sprays by Each Nare route daily. 3 Bottle 3     Allergies:  Pcn [bicillin l-a]     Social History:    Social History     Socioeconomic History    Marital status:      Spouse name: Not on file    Number of children: 3    Years of education: Not on file    Highest education level: Not on file   Occupational History    Occupation: sales   Tobacco Use    Smoking status: Never    Smokeless tobacco: Never   Vaping Use    Vaping Use: Never used   Substance and Sexual Activity    Alcohol use: No    Drug use: No    Sexual activity: Not on file   Other Topics Concern    Not on file   Social History Narrative    Not on file     Social Determinants of Health     Financial Resource Strain: Not on file   Food Insecurity: Not on file   Transportation Needs: Not on file   Physical Activity: Not on file   Stress: Not on file   Social Connections: Not on file   Housing Stability: Not on file     Surgical History:    Past Surgical History:   Procedure Laterality Date    LITHOTRIPSY      OTHER SURGICAL HISTORY      foot    OTHER SURGICAL HISTORY      lasik    OTHER SURGICAL HISTORY      acl    OTHER SURGICAL HISTORY      wrist,right    OTHER SURGICAL HISTORY  10/22/13    Cystoscopy - Dr. Shandra [facility] ENDOSCOPY,CHIQUITA W GUIDE  9/24/13= Esophageal ring    No Jaimes's     Family History:    Family History   Problem Relation Age of Onset    Diabetes Other     Other (Other) Father         copd    Diabetes Mother      Food Journal  Reviewed and Discussed:          Patient has a Food Journal?:  no   Patient is reading nutrition labels?  yes  Average Caloric Intake:         <2000  Average CHO Intake:  unsure  Is patient exercising?  yes  Type of exercise?  Bike for  1 Hour     Eating Habits  Patient states the following:  Eats 3 meal(s) per day  Length of time it takes to consume a meal:  15 mins  # of snacks per day: 1          Type  of snacks:  eggs, cherries  Amount of soda consumption per day:  0  Amount of water (in ounces) per day:  64 oz  Drinking between meals only:  no  Toughest challenge:  the mental aspect       Nutritional Goals  Calorie-controlled diet:  2000    Behavior Modifications Reviewed and Discussed  Maintain a daily food journal, No drinking 30 minutes before or after meals, Eat slowly and take 20 to 30 minutes to complete each meal, and Other:  Salad for lunch    Exercise Goals Reviewed and Discussed    Other:  20-40 min    ROS:    Pertinent items are noted in HPI.  A comprehensive review of systems was negative.  All other systems were reviewed and are negative    Physical Exam:   /72 (BP Location: Right arm, Patient Position: Sitting, Cuff Size: adult)   Pulse 72   Resp 18   Ht 6' 1\" (1.854 m)   Wt 242 lb (109.8 kg)   SpO2 97%   BMI 31.93 kg/m²   General appearance: alert, appears stated age, and cooperative  Head: Normocephalic, without obvious abnormality, atraumatic  Neck: no adenopathy, no carotid bruit, no JVD, supple, symmetrical, trachea midline, and thyroid not enlarged, symmetric, no tenderness/mass/nodules  Lungs: clear to auscultation bilaterally  Heart: S1, S2 normal, no murmur, click, rub or gallop, regular rate and rhythm  Abdomen: soft, non-tender; bowel sounds normal; no masses,  no organomegaly  RECTAL: no hemorrhoids or tears currently  Extremities: extremities normal, atraumatic, no cyanosis or edema  Skin: Skin color, texture, turgor normal. No rashes or lesions; SK left cheek -- would like to consider freezing off    ASSESSMENT/PLAN     Encounter Diagnoses   Name Primary?    Class 1 obesity due to excess calories without serious comorbidity with body mass index (BMI) of 31.0 to 31.9 in adult Yes    JP on CPAP     SK (seborrheic keratosis)     Anal irritation     Dietary counseling     Exercise counseling        No orders of the defined types were placed in this encounter.      Meds &  Refills for this Visit:  Requested Prescriptions      No prescriptions requested or ordered in this encounter       Imaging & Consults:  None      Cont. Diet and exercise as discussed.  Monitor calorie intake exercise daysvx non exercise days.  Cheat day is Saturday.  Sunday recovers,etc.  Cutting down on drinking.  No meds.  Consider freezing SK.  Advised to lighten wiping.  No hemorrhoids.  Prep wipes prn.      Return in about 4 weeks (around 4/11/2024) for OM -15.

## 2024-03-14 NOTE — PROGRESS NOTES
Food Journal  Reviewed and Discussed:          Patient has a Food Journal?:  no   Patient is reading nutrition labels?  yes  Average Caloric Intake:          >2000  Average CHO Intake:  unsure  Is patient exercising?  yes  Type of exercise?  Bike for  1 Hour     Eating Habits  Patient states the following:  Eats 3 meal(s) per day  Length of time it takes to consume a meal:  15 mins  # of snacks per day: 1          Type of snacks:  eggs, cherries  Amount of soda consumption per day:  0  Amount of water (in ounces) per day:  64 oz  Drinking between meals only:  no  Toughest challenge:  the mental aspect

## 2024-04-18 ENCOUNTER — OFFICE VISIT (OUTPATIENT)
Dept: FAMILY MEDICINE CLINIC | Facility: CLINIC | Age: 54
End: 2024-04-18
Payer: COMMERCIAL

## 2024-04-18 VITALS
RESPIRATION RATE: 18 BRPM | HEART RATE: 67 BPM | DIASTOLIC BLOOD PRESSURE: 76 MMHG | HEIGHT: 73 IN | BODY MASS INDEX: 33.66 KG/M2 | WEIGHT: 254 LBS | SYSTOLIC BLOOD PRESSURE: 130 MMHG | OXYGEN SATURATION: 96 %

## 2024-04-18 DIAGNOSIS — Z23 NEED FOR VACCINATION: Primary | ICD-10-CM

## 2024-04-18 DIAGNOSIS — E55.9 VITAMIN D DEFICIENCY: ICD-10-CM

## 2024-04-18 DIAGNOSIS — Z71.3 DIETARY COUNSELING: ICD-10-CM

## 2024-04-18 DIAGNOSIS — E66.09 CLASS 1 OBESITY DUE TO EXCESS CALORIES WITHOUT SERIOUS COMORBIDITY WITH BODY MASS INDEX (BMI) OF 33.0 TO 33.9 IN ADULT: Primary | ICD-10-CM

## 2024-04-18 DIAGNOSIS — Z71.82 EXERCISE COUNSELING: ICD-10-CM

## 2024-04-18 DIAGNOSIS — K75.81 NASH (NONALCOHOLIC STEATOHEPATITIS): ICD-10-CM

## 2024-04-18 DIAGNOSIS — R79.89 ELEVATED LIVER FUNCTION TESTS: ICD-10-CM

## 2024-04-18 DIAGNOSIS — E78.00 PURE HYPERCHOLESTEROLEMIA: ICD-10-CM

## 2024-04-18 DIAGNOSIS — G47.33 OSA ON CPAP: ICD-10-CM

## 2024-04-18 DIAGNOSIS — E78.2 MIXED HYPERLIPIDEMIA: ICD-10-CM

## 2024-04-18 RX ORDER — ERGOCALCIFEROL 1.25 MG/1
50000 CAPSULE ORAL WEEKLY
Qty: 8 CAPSULE | Refills: 0 | Status: SHIPPED | OUTPATIENT
Start: 2024-04-18 | End: 2024-06-07

## 2024-04-18 RX ORDER — PHENTERMINE HYDROCHLORIDE 37.5 MG/1
37.5 TABLET ORAL
Qty: 30 TABLET | Refills: 1 | Status: SHIPPED | OUTPATIENT
Start: 2024-04-18

## 2024-04-18 RX ORDER — CHOLECALCIFEROL (VITAMIN D3) 50 MCG
1 TABLET ORAL DAILY
COMMUNITY
Start: 2024-04-18

## 2024-04-18 NOTE — PROGRESS NOTES
Food Journal  Reviewed and Discussed:          Patient has a Food Journal?:  no   Patient is reading nutrition labels?  yes  Average Caloric Intake:          unsure  Average CHO Intake:  unsure  Is patient exercising?  no was exercising but has cut back due to his knee pain     Eating Habits  Patient states the following:  Eats 3 meal(s) per day  Length of time it takes to consume a meal:  20 mins  # of snacks per day: 0          Type of snacks:  no  Amount of soda consumption per day:  0  Amount of water (in ounces) per day:  60 oz  Drinking between meals only:  no  Toughest challenge:  working out regularly and consumption of certain foods and alcohol

## 2024-04-18 NOTE — PROGRESS NOTES
[facility], [address] [address]  Dept: [phone]       Patient:  Dariel Bray  :      1970  MRN:      [id number]    Chief Complaint:    Chief Complaint   Patient presents with    Weight Check       SUBJECTIVE     History of Present Illness:  Dariel is being seen today for a follow-up for weight  and traveling a lot.  Was in [place] last week.  Trying to exercise.  Noted pain in left knee.  Trying not eat red meat.  Eating fish.        Past Medical History:   Past Medical History:    AR (allergic rhinitis)    Esophageal ring    HIGH CHOLESTEROL    not on medication    Hyperlipidemia    Kidney stone    ALVAREZ (nonalcoholic steatohepatitis)    Plantar fasciitis    Sleep apnea    Unspecified sleep apnea    AHI 23.7 SaO2 87% CPAP 8 [facility]          OBJECTIVE     Vitals: /76 (BP Location: Right arm, Patient Position: Sitting, Cuff Size: large)   Pulse 67   Resp 18   Ht 6' 1\" (1.854 m)   Wt 254 lb (115.2 kg)   SpO2 96%   BMI 33.51 kg/m²     Initial weight loss: -12   Total weight loss: -12   Start weight: 251    Wt Readings from Last 3 Encounters:   24 254 lb (115.2 kg)   24 242 lb (109.8 kg)   24 251 lb (113.9 kg)       Patient Medications:    Current Outpatient Medications   Medication Sig Dispense Refill    Phentermine HCl 37.5 MG Oral Tab Take 1 tablet (37.5 mg total) by mouth every morning before breakfast. 30 tablet 1    PEG 3350-KCl-Na Bicarb-NaCl 420 g Oral Recon Soln Take as directed by physician. 4000 mL 0    loratadine 10 MG Oral Tab Take 1 tablet (10 mg total) by mouth daily.      NUCALA 100 MG/ML Subcutaneous Solution Auto-injector       SYMBICORT 160-4.5 MCG/ACT Inhalation Aerosol INHALE 2 PUFFS TWICE A DAY *RINSE MOUTH AFTER USE*      Azelastine HCl 0.1 % Nasal Solution 2 sprays by Nasal route 2 (two) times daily.      Fluticasone Propionate 50 MCG/ACT Nasal  Suspension 2 sprays by Each Nare route daily. 3 Bottle 3     Allergies:  Pcn [bicillin l-a]     Social History:    Social History     Socioeconomic History    Marital status:      Spouse name: Not on file    Number of children: 3    Years of education: Not on file    Highest education level: Not on file   Occupational History    Occupation: sales   Tobacco Use    Smoking status: Never    Smokeless tobacco: Never   Vaping Use    Vaping status: Never Used   Substance and Sexual Activity    Alcohol use: No    Drug use: No    Sexual activity: Not on file   Other Topics Concern    Not on file   Social History Narrative    Not on file     Social Determinants of Health     Financial Resource Strain: Not on file   Food Insecurity: Not on file   Transportation Needs: Not on file   Physical Activity: Not on file   Stress: Not on file   Social Connections: Not on file   Housing Stability: Not on file     Surgical History:    Past Surgical History:   Procedure Laterality Date    Lithotripsy      Other surgical history      foot    Other surgical history      lasik    Other surgical history      acl    Other surgical history      wrist,right    Other surgical history  10/22/13    Cystoscopy - Dr. Shandra Quijano gi endoscopy,pete w guide  9/24/13= Esophageal ring    No Jaimes's     Family History:    Family History   Problem Relation Age of Onset    Diabetes Other     Other (Other) Father         copd    Diabetes Mother        Food Journal  Reviewed and Discussed:          Patient has a Food Journal?:  no   Patient is reading nutrition labels?  yes  Average Caloric Intake:          unsure  Average CHO Intake:  unsure  Is patient exercising?  no was exercising but has cut back due to his knee pain     Eating Habits  Patient states the following:  Eats 3 meal(s) per day  Length of time it takes to consume a meal:  20 mins  # of snacks per day: 0          Type of snacks:  no  Amount of soda consumption per day:  0  Amount of  water (in ounces) per day:  60 oz  Drinking between meals only:  no  Toughest challenge:  working out regularly and consumption of certain foods and alcohol         ROS:    Pertinent items are noted in HPI.  A comprehensive review of systems was negative.  All other systems were reviewed and are negative    Physical Exam:   /76 (BP Location: Right arm, Patient Position: Sitting, Cuff Size: large)   Pulse 67   Resp 18   Ht 6' 1\" (1.854 m)   Wt 254 lb (115.2 kg)   SpO2 96%   BMI 33.51 kg/m²   General appearance: alert, appears stated age, and cooperative  Head: Normocephalic, without obvious abnormality, atraumatic  Neck: no adenopathy, no carotid bruit, no JVD, supple, symmetrical, trachea midline, and thyroid not enlarged, symmetric, no tenderness/mass/nodules  Lungs: clear to auscultation bilaterally  Heart: S1, S2 normal, no murmur, click, rub or gallop, regular rate and rhythm  Abdomen: soft, non-tender; bowel sounds normal; no masses,  no organomegaly  Extremities: extremities normal, atraumatic, no cyanosis or edema  Skin: Skin color, texture, turgor normal. No rashes or lesions    ASSESSMENT/PLAN     Encounter Diagnoses   Name Primary?    Class 1 obesity due to excess calories without serious comorbidity with body mass index (BMI) of 33.0 to 33.9 in adult Yes    Dietary counseling     Exercise counseling     JP on CPAP     ALVAREZ (nonalcoholic steatohepatitis)        No orders of the defined types were placed in this encounter.      Nutritional Goals  Other: Food journal    Behavior Modifications Reviewed and Discussed  Drink 64 oz of water per day, Utlize portion control strategies to reduce calorie intake, Identify triggers for eating and manage cues, and Eat slowly and take 20 to 30 minutes to complete each meal    Exercise Goals Reviewed and Discussed    Walk for  30 Minutes      Meds & Refills for this Visit:  Requested Prescriptions     Signed Prescriptions Disp Refills    Phentermine HCl 37.5  MG Oral Tab 30 tablet 1     Sig: Take 1 tablet (37.5 mg total) by mouth every morning before breakfast.       Imaging & Consults:  ELECTROCARDIOGRAM, COMPLETE    EKG today was normal sinus rhythm, rate of 67, no acute change, QT within normal limits  Advised to monitor blood pressure since it is mildly elevated today.  Advised to buy arm cuff blood pressure machine.  Focus on low-salt diet.  Will start phentermine 37.5 mg tablet daily.  Discussed side effects at length.      Return in about 6 weeks (around 5/30/2024) for med check, OM -15.

## 2024-05-28 ENCOUNTER — OFFICE VISIT (OUTPATIENT)
Facility: CLINIC | Age: 54
End: 2024-05-28

## 2024-05-28 VITALS
BODY MASS INDEX: 31.54 KG/M2 | SYSTOLIC BLOOD PRESSURE: 114 MMHG | RESPIRATION RATE: 18 BRPM | DIASTOLIC BLOOD PRESSURE: 70 MMHG | HEIGHT: 73 IN | OXYGEN SATURATION: 98 % | HEART RATE: 97 BPM | WEIGHT: 238 LBS

## 2024-05-28 DIAGNOSIS — J30.9 ALLERGIC RHINITIS, UNSPECIFIED SEASONALITY, UNSPECIFIED TRIGGER: ICD-10-CM

## 2024-05-28 DIAGNOSIS — G47.33 OSA ON CPAP: Primary | ICD-10-CM

## 2024-05-28 DIAGNOSIS — K21.9 GASTROESOPHAGEAL REFLUX DISEASE WITHOUT ESOPHAGITIS: ICD-10-CM

## 2024-05-28 PROCEDURE — 99204 OFFICE O/P NEW MOD 45 MIN: CPT | Performed by: INTERNAL MEDICINE

## 2024-05-28 NOTE — PROGRESS NOTES
Pulmonary/Critical Care/Sleep Medicine    Consult Note     PCP: Yudy Covarrubias DO   Phone: [phone]   Fax: [phone]        Chief Complaint   Patient presents with    New Patient Chief Complaint     DME:[facility]   MASK:Nasal   DEVICE:3 years        HPI  I had the pleasure of seeing Jay Bray who is a pleasant 53 year old male who presents for evaluation of JP         The patient states that he has snored at home in past and was noted to have witnessed apnea, he has been diagnosed with JP and needed a NEW doctor, so he presents for sleep evaluation.      He states bed time around 9-10 PM . It takes few min  to fall asleep and leaves bed around 6 AM. He wakes up sometimes 1  times a night while using the CPAP machine that is 3 years old..  He is NOT sleepy or fatigued during the daytime.  He admits to tossing and turning at night.  He denies nightmares, sleep talking or sleep walking.  He admits to occasional  AM headaches.  He denies symptoms sleep attacks     The patient denies kicking legs at night. Denies teeth grinding.    The patient reports using auto CPAP daily at night at 6-12 cmH2O regularly throughout the night for about 8 hours and states that the  PAP machine is quiet and has a heated humidifier. He uses nasal pillows  He uses a travel CPAP as well     The patient states that he does not use humidifier on CPAP machine as it caused drynewss of nose and nose bleed. He notes occasional bloating     He drinks 2 cups of caffeine coffee daily,     He has pets 1 dog  that does not sleep in bed.       Hx of tobacco use: He  reports that he has never smoked. He has never used smokeless tobacco.    Past Medical History:    AR (allergic rhinitis)    Esophageal ring    GERD (gastroesophageal reflux disease)    HIGH CHOLESTEROL    not on medication    Hyperlipidemia    Kidney stone    ALVAREZ (nonalcoholic steatohepatitis)    Plantar fasciitis    Sleep apnea    Unspecified sleep apnea    AHI 23.7 SaO2  87% CPAP 8 [facility]      Past Surgical History:   Procedure Laterality Date    Lithotripsy      Other surgical history      foot    Other surgical history      lasik    Other surgical history      acl    Other surgical history      wrist,right    Other surgical history  10/22/13    Cystoscopy - Dr. Devi     Up gi endoscopy,pete w guide  9/24/13= Esophageal ring    No Jaimes's     Allergies   Allergen Reactions    Pcn [Bicillin L-A]      Pt not allergic. Parents were     Current Outpatient Medications   Medication Sig Dispense Refill    Phentermine HCl 37.5 MG Oral Tab Take 1 tablet (37.5 mg total) by mouth every morning before breakfast. 30 tablet 1    ergocalciferol 1.25 MG (49259 UT) Oral Cap Take 1 capsule (50,000 Units total) by mouth once a week for 8 doses. 8 capsule 0    Cholecalciferol (VITAMIN D) 50 MCG (2000 UT) Oral Tab Take 1 tablet by mouth daily. Start after completing high dose vitamin D.      PEG 3350-KCl-Na Bicarb-NaCl 420 g Oral Recon Soln Take as directed by physician. 4000 mL 0    loratadine 10 MG Oral Tab Take 1 tablet (10 mg total) by mouth daily.      NUCALA 100 MG/ML Subcutaneous Solution Auto-injector       SYMBICORT 160-4.5 MCG/ACT Inhalation Aerosol INHALE 2 PUFFS TWICE A DAY *RINSE MOUTH AFTER USE*      Azelastine HCl 0.1 % Nasal Solution 2 sprays by Nasal route 2 (two) times daily.      Fluticasone Propionate 50 MCG/ACT Nasal Suspension 2 sprays by Each Nare route daily. 3 Bottle 3      Social History     Socioeconomic History    Marital status:     Number of children: 3   Occupational History    Occupation: sales     Comment: [employer] bills   Tobacco Use    Smoking status: Never    Smokeless tobacco: Never   Vaping Use    Vaping status: Never Used   Substance and Sexual Activity    Alcohol use: No    Drug use: No      Immunization History   Administered Date(s) Administered    Covid-19 Vaccine Moderna 100 mcg/0.5 ml 03/23/2021, 04/20/2021    Covid-19 Vaccine  Moderna 50 Mcg/0.25 Ml 12/17/2021    FLU VAC QIV SPLIT 3 YRS AND OLDER (00752) 11/13/2020    FLUZONE 6 months and older PFS 0.5 ml (46834) 12/17/2021, 02/14/2024    Pneumococcal Conjugate PCV20 04/04/2023    Pneumovax 23 11/13/2020    Positive Measles Titer 02/12/2024    Positive Mumps Titer 02/12/2024    TDAP 10/27/2009, 02/14/2024    Zoster Vaccine Recombinant Adjuvanted (Shingrix) 04/04/2023, 02/14/2024   Deferred Date(s) Deferred    Pneumovax 23 08/22/2018      Family History   Problem Relation Age of Onset    Diabetes Other     Other (Other) Father         copd    Diabetes Mother         Review of Systems   Constitutional:  Negative for fatigue, fever and unexpected weight change.   HENT:  Negative for congestion, mouth sores, nosebleeds, postnasal drip, rhinorrhea, sore throat and trouble swallowing.    Eyes:  Negative for visual disturbance.   Respiratory:  Negative for apnea, cough, choking, chest tightness, shortness of breath and wheezing.    Cardiovascular:  Negative for chest pain, palpitations and leg swelling.   Gastrointestinal:  Negative for abdominal pain, constipation, diarrhea, nausea and vomiting.   Genitourinary:  Negative for difficulty urinating.   Musculoskeletal:  Negative for arthralgias, back pain, gait problem and myalgias.   Neurological:  Negative for dizziness, weakness and headaches.   Psychiatric/Behavioral:  Negative for sleep disturbance.         Vitals:    05/28/24 1101   BP: 114/70   Pulse: 97   Resp: 18      SpO2: 98 %  Ht Readings from Last 1 Encounters:   05/28/24 6' 1\" (1.854 m)     Wt Readings from Last 1 Encounters:   05/28/24 238 lb (108 kg)     Body mass index is 31.4 kg/m².     Physical Exam  Constitutional:       General: He is not in acute distress.     Appearance: Normal appearance. He is obese. He is not ill-appearing or diaphoretic.   HENT:      Head: Normocephalic and atraumatic.      Nose: Nose normal. No congestion or rhinorrhea.      Comments: Narrow edematous  nares      Mouth/Throat:      Mouth: Mucous membranes are moist.      Pharynx: Oropharynx is clear. No oropharyngeal exudate or posterior oropharyngeal erythema.      Comments: Mallampati class IV palate   Eyes:      Extraocular Movements: Extraocular movements intact.      Pupils: Pupils are equal, round, and reactive to light.   Cardiovascular:      Rate and Rhythm: Normal rate.      Pulses: Normal pulses.      Heart sounds: Normal heart sounds. No murmur heard.  Pulmonary:      Effort: Pulmonary effort is normal. No respiratory distress.      Breath sounds: Normal breath sounds. No wheezing or rhonchi.   Chest:      Chest wall: No tenderness.   Abdominal:      General: Abdomen is flat. Bowel sounds are normal.      Palpations: Abdomen is soft.   Musculoskeletal:         General: Normal range of motion.   Skin:     General: Skin is warm.   Neurological:      General: No focal deficit present.      Mental Status: He is alert and oriented to person, place, and time.   Psychiatric:         Mood and Affect: Mood normal.         Behavior: Behavior normal.         Thought Content: Thought content normal.         Judgment: Judgment normal.             Labs:  Last BMP  Lab Results   Component Value Date     (H) 02/14/2024    BUN 13 02/14/2024    CREATSERUM 1.20 02/14/2024    BUNCREA 14.0 03/04/2021    ANIONGAP 3 02/14/2024    GFRAA 73 09/22/2021    GFRNAA 63 09/22/2021    CA 9.4 02/14/2024     02/14/2024    K 4.3 02/14/2024     02/14/2024    CO2 28.0 02/14/2024    OSMOCALC 287 02/14/2024      Last CBC  Lab Results   Component Value Date    WBC 6.0 02/14/2024    RBC 5.49 02/14/2024    HGB 15.9 02/14/2024    HCT 47.6 02/14/2024    MCV 86.7 02/14/2024    MCH 29.0 02/14/2024    MCHC 33.4 02/14/2024    RDW 13.8 02/14/2024    .0 02/14/2024      Last CMP  Lab Results   Component Value Date     (H) 02/14/2024    BUN 13 02/14/2024    BUNCREA 14.0 03/04/2021    CREATSERUM 1.20 02/14/2024    ANIONGAP  3 02/14/2024    GFR >59 10/28/2009    GFRNAA 63 09/22/2021    GFRAA 73 09/22/2021    CA 9.4 02/14/2024    OSMOCALC 287 02/14/2024    ALKPHO 105 02/14/2024    AST 95 (H) 02/14/2024     (H) 02/14/2024    BILT 0.7 02/14/2024    TP 7.6 02/14/2024    ALB 3.9 02/14/2024    GLOBULIN 3.7 02/14/2024    AGRATIO 1.7 10/28/2009     02/14/2024    K 4.3 02/14/2024     02/14/2024    CO2 28.0 02/14/2024      Last Thyroid Function  Lab Results   Component Value Date    TSH 2.290 02/14/2024        Imaging:  No results found.         [facility]       Accredited by the American Academy of Sleep Medicine (AASM)     PATIENT'S NAME:        RON DAVIS  REFERRING PHYSICIAN:  CONSULTING PHYSICIAN:  Steven Pennington M.D.  PATIENT ACCOUNT #:     [id number]       LOCATION:       [facility]  MEDICAL RECORD #:      [id number]         YOB: 1970  DATE OF STUDY:         01/22/2013                               SLEEP STUDY REPORT        STUDY TYPE:  Polysomnogram.     CLINICAL HISTORY:  Patient is a 42-year-old with a history of daytime  sleepiness.  He had a baseline sleep study which revealed an  apnea-hypopnea index of 23.7.  He presents now for a CPAP titration.     CPAP TITRATION RECORDING PARAMETERS:  The patient underwent a formal  CPAP titration at the [facility].  The study was acquired in  compliance with the AASM Manual for the Scoring of Sleep and  Associated Events.  The following parameters were monitored: EEG,  EOG, ECG, chin EMG, left and right tibialis EMG, snore microphone,  respiratory inductance plethysmography, and oxygen saturation via  continuous pulse oximetry.  Body position is documented via  technician notes every 15 minutes.  There is an additional channel  for     POLYSOMNOGRAPHIC FINDINGS:     SLEEP ARCHITECTURE:  Total recording time 450 minutes, total sleep  time 384 minutes, for a sleep efficiency of 85%.     SLEEP STAGING:  Sleep stage breakdown is as follows:   Stage 1 at 12%,  stage 2 at 30%, slow-wave sleep at 38%.  REM sleep at 20%.  Sleep  onset latency was immediate.  REM onset latency was 68 minutes.  Arousal index 15.9.     RESPIRATORY MEASURES:  CPAP was initiated at 5 cm of water.  It was  titrated to an ending pressure of 8 cm of water.  At 8 cm of water,  REM sleep was detected.  Snoring and upper airway resistance appeared  to be resolved.  The apnea-hypopnea index was zero.  The oxygen natalie  was 92%.     EKG:  The EKG demonstrates sinus rhythm.     PERIODIC LIMB MOVEMENTS AND EMG ACTIVITY:  PLM index zero.     EEG:  With a limited recording montage, no EEG abnormalities were  detected.     IMPRESSION:  Significant sleep apnea with successful CPAP titration.     RECOMMENDATIONS:  1.    Patient should be prescribed CPAP at a level of 8 cm of water.  Heated humidity at a level of 3 and EPR at a level of 1 were used     PATIENT NAME: DARIEL BRAY                   ACCOUNT #: [id number]                                              MEDICAL RECORD #: [id number]                                                                        along with a ResMed Mirage FX standard mask.  2.    The patient should avoid alcohol or sedatives prior to sleep.  3.    The patient should avoid driving or operating heavy machinery  while sleepy.  4.    The patient will follow up in our sleep clinic within 8-10  weeks of initiating CPAP therapy.  We will also order the patient's  CPAP equipment.     Dictated By Steven Pennington M.D.  d:    2013 20:31:10    Dariel Bray  2024 - 2024  Patient ID: [id number]  : 1970  Age: 53 years  [facility]  [address] [place] [address], [address]  Phone: [phone]  Email: [email or web address]  Compliance Report  Compliance  Payor Standard  Usage 2024 - 2024  Usage days 53/90 days (59%)  >= 4 hours 53 days (59%)  < 4 hours 0 days (0%)  Usage hours 460 hours 47  minutes  Average usage (total days) 5 hours 7 minutes  Average usage (days used) 8 hours 42 minutes  Median usage (days used) 8 hours 48 minutes  Total used hours (value since last reset - 05/27/2024) 8,416 hours  AirSense 10 AutoSet  Serial number [id number]  Mode AutoSet  Min Pressure 6 cmH2O  Max Pressure 12 cmH2O  EPR Fulltime  EPR level 2  Response Standard  Therapy  Pressure - cmH2O Median: 7.6 95th percentile: 10.9 Maximum: 11.8  Leaks - L/min Median: 0.4 95th percentile: 9.7 Maximum: 16.3  Events per hour AI: 0.2 HI: 0.2 AHI: 0.4  Apnea Index Central: 0.1 Obstructive: 0.1 Unknown: 0.0  RERA Index 0.0  Cheyne-Rayo respiration (average duration per night) 0 minutes (0%)         Alicia Sleepiness Scale: (ESS) score on today's visit is  6 out of 24.     Score total of 1-6    Normal sleep   Score total of 7-8    Average sleepiness   Score total of 9-24    Abnormal (possibly pathologic) sleepiness       Impression:    Obstructive sleep apnea syndrome (OSAS): Attended sleep study performed on 2013  showed  baseline sleep study which revealed an apnea-hypopnea index of 23.7. Treated with auto CPAP 6-12 cwp with nasal cushions .  Download data on 5/27/2024   for 90  days   shows adequate compliance and AHI  Daytime hypersomnolence/fatigue  Obesity: Class I  ;  Body mass index is 31.4 kg/m².  Allergic Rhinitis: controlled symptoms   Dry nose with nose bleed occasional: suspect due to not using humidifier  GERD: controlled symptoms but with intermittent food sticking in esophagus   Esophagea stenosis with hx balloon dilatation, with report of food sticking in esophagus at time   Hypercholesterolemia  ALVAREZ                                 Plan:    Continue current CPAP 6-12 as patient is using and benefiting from CPAP use  Advised about weight loss   GERD precautions   Use saline gel/ saline nasal spray as needed to prevent nasal dryness   Gastroenterology consult   Advised against drowsy driving and to avoid  alcoholic beverage and respiratory depressants as these may worsen sleep apnea      Follow up: 8  months     Thank you for allowing me to participate in your patient care.    PEPITO Doyle MD, FACP, FCCP, FAA - Pulmonary/Critical care/Sleep Medicine  Please contact our office if you have any questions or concerns at [phone]    Note to the patient: The 21st Century Cures Act makes medical notes like these available to patients in the interest of transparency. However, be advised that this is a medical document. It is intended as peer to peer communication. It is written in medical language and may contain abbreviations or verbiage that are unfamiliar. It may appear blunt or direct. Medical documents are intended to carry relevant information, facts as evident, and clinical opinion of the practitioner.      Disclaimer: Components of this note were documented using voice recognition system and are subject to errors not corrected at proofreading. Contact the author of this note for any clarifications

## 2024-05-28 NOTE — PROGRESS NOTES
BrayDariel  2024 - 2024  Patient ID: [id number]  : 1970  Age: 53 years  [facility]  [address] [place] [address]  [facility]  [place], [address]  Phone: [phone]  Email: [email or web address]  Compliance Report  Compliance  Payor Standard  Usage 2024 - 2024  Usage days 53/90 days (59%)  >= 4 hours 53 days (59%)  < 4 hours 0 days (0%)  Usage hours 460 hours 47 minutes  Average usage (total days) 5 hours 7 minutes  Average usage (days used) 8 hours 42 minutes  Median usage (days used) 8 hours 48 minutes  Total used hours (value since last reset - 2024) 8,416 hours  AirSense 10 AutoSet  Serial number [id number]  Mode AutoSet  Min Pressure 6 cmH2O  Max Pressure 12 cmH2O  EPR Fulltime  EPR level 2  Response Standard  Therapy  Pressure - cmH2O Median: 7.6 95th percentile: 10.9 Maximum: 11.8  Leaks - L/min Median: 0.4 95th percentile: 9.7 Maximum: 16.3  Events per hour AI: 0.2 HI: 0.2 AHI: 0.4  Apnea Index Central: 0.1 Obstructive: 0.1 Unknown: 0.0  RERA Index 0.0  Cheyne-Rayo respiration (average duration per night) 0 minutes (0%)

## 2024-05-28 NOTE — PATIENT INSTRUCTIONS
Plan:    Continue current CPAP 6-12 as patient is using and benefiting from CPAP use  Advised about weight loss   Use saline gel/ saline nasal spray as needed to prevent nasal dryness   GERD precautions   Gastroenterology consult   Advised against drowsy driving and to avoid alcoholic beverage and respiratory depressants as these may worsen sleep apnea      Follow up: 8  months     Claudy Doyle MD      Obstructive Sleep Apnea  Obstructive sleep apnea is a condition caused by air passages becoming narrowed or blocked during sleep. As a result, breathing stops for short periods. Your body wakes up enough for breathing to start again. But you don't remember it. The cycle of stopped breathing and brief awakenings can repeat dozens of times a night. This prevents the body from getting to the deeper stages of sleep that are needed for good rest.   Signs of sleep apnea include loud snoring, noisy breathing, and gasping sounds during sleep. People with sleep apnea often find they use the bathroom many times during the night. Daytime symptoms include waking up tired after a full night's sleep and waking up with headaches. They can also include feeling very sleepy or falling asleep during the day, and having problems with memory or concentration.   Risk factors for sleep apnea include:  Being overweight  Being assigned male at birth, or being in menopause  Smoking  Using alcohol or sedating medicines  Having enlarged structures in the nose or throat such as enlarged tonsils or adenoids, or extra tissue in the airway  Home care  Lifestyle changes that can help treat snoring and sleep apnea include:   If you're overweight, talk with your healthcare provider about a weight-loss plan for you.  Don't drink alcohol for 3 to 4 hours before bedtime.  Don't take sedating medicines. Ask your healthcare provider about the medicines you take.  If you smoke, talk to your provider about ways to quit. It's important to stay away from  secondhand smoke. Don't use e-cigarettes because of their harmful side effects.  Sleep on your side. This can help prevent gravity from pulling relaxed throat tissues into your breathing passages.  If you have allergies or sinus problems that block your nose, ask your provider for help.  Use positive airway pressure (PAP). Discuss with your provider the benefits of using PAP at home. And talk about the type of PAP that's best for you.  Follow-up care  Follow up with your healthcare provider, or as advised. A diagnosis of sleep apnea is made with a sleep study. Your provider can tell you more about this test.   When to get medical care  See your healthcare provider if you have daytime symptoms of sleep apnea. These include:   Waking up tired after a full night's sleep  Waking up with a headache  Feeling very sleepy or falling asleep during the day  Having problems with memory or concentration  Also talk with your provider if your partner tells you that you snore, gasp for air, or stop breathing while you sleep.   Seeing your provider is important because sleep apnea can make you more likely to have certain health problems. These include high blood pressure, heart attack, stroke, and sexual dysfunction. If you have sleep apnea, talk with your healthcare provider about the best treatments for you.   [employer] last reviewed this educational content on 5/1/2022 © 2000-2023 [employer]. All rights reserved. This information is not intended as a substitute for professional medical care. Always follow your healthcare professional's instructions.        Continuous Positive Air Pressure (CPAP)     A mask over the nose gently directs air into the throat to keep the airway open.     Continuous positive air pressure (CPAP) uses gentle air pressure to hold the airway open. CPAP is often the most effective treatment for sleep apnea. It works very well as a treatment for adults diagnosed with obstructive sleep apnea  with a lot of sleepiness. But keep in mind that it can take several adjustments before the setup is right for you.   How CPAP works  The CPAP machine  is a small portable pump that sits beside the bed. The pump sends air through a hose, which is held over your nose alone, or nose and mouth by a mask. Mild air pressure is gently pushed through your airway. The air pressure nudges sagging tissues aside. This widens the airway so you can breathe better. CPAP may be combined with other kinds of therapy for sleep apnea.   Types of air pressure treatments  There are different types of CPAP. Your doctor or CPAP technician will help you decide which type is best for you:   Basic CPAP keeps the pressure constant all night long.  A bilevel device (BiPAP) provides more pressure when you breathe in and less when you breathe out. A BiPAP machine also may be set to provide automatic breaths to maintain breathing if you stop breathing while sleeping.  An autoCPAP device automatically adjusts pressure throughout the night and in response to changes such as body position, sleep stage, and snoring.  [employer] last reviewed this educational content on 7/1/2019  © 3049-7857 [employer]. All rights reserved. This information is not intended as a substitute for professional medical care. Always follow your healthcare professional's instructions.     GERD (Adult)    The esophagus is a tube that carries food from the mouth to the stomach. A valve (lower esophageal sphincter) prevents stomach acid from flowing upward. Sometimes this valve doesn't work correctly. Then stomach contents may flow (reflux) into the esophagus. When it happens again and again, it's called GERD (gastroesophageal reflux disease). GERD can irritate the esophagus. It can cause pain. It can also cause problems with swallowing or breathing. In severe cases, GERD can cause pneumonia that keeps coming back. This is from breathing in particles (aspiration).    Symptoms of reflux include burning, pressure, or sharp pain in the upper belly (abdomen). Symptoms may also be in the mid- to lower chest. The pain can spread to the neck, back, or shoulder. You may have:   Belching  Acid taste in the back of the throat  Chronic cough  Sore throat  Hoarseness  GERD symptoms often occur during the day after a big meal. They can also occur at night when lying down.    Home care  Lifestyle changes can help ease symptoms. Your healthcare provider may also prescribe medicines. Symptoms often get better with treatment. But if treatment is stopped, the symptoms often return after a few months. Most people with GERD will need to continue treatment. Or they may need treatment on and off.   Lifestyle changes  Limit or don't eat fatty, fried, or spicy foods. Also limit coffee, chocolate, mint, and foods with high acid content. These include tomatoes and citrus fruit and juices (orange, grapefruit, and lemon).  Don’t eat large meals, especially at night. Frequent, smaller meals are best. Don't lie down right after eating. And don’t eat anything 3 hours before going to bed.  Don't drink alcohol or smoke. As much as possible, stay away from secondhand smoke.  If you are overweight, losing weight will reduce symptoms.   Don't wear tight clothing around your stomach area.  If your symptoms occur during sleep, use a foam wedge to raise your upper body not just your head. Or place 4-inch blocks under the head of your bed. Or use 2 bed risers under your bed frame.  Talk with your provider if you have trouble making the suggested lifestyle changes. They may be able to give you resources to help.  Medicines  Medicines can help ease the symptoms of GERD. They also help prevent damage to the esophagus. Discuss a medicine plan with your healthcare provider. This may include one or more of these medicines:   Antacids. These help neutralize the normal acids in your stomach.  Acid blockers (histamine or H2  blockers). These decrease how much acid your stomach makes.  Acid inhibitors (proton pump inhibitors PPIs). These also decrease how much acid your body makes, but in a different way from the blockers. They may work better. But they can take a little longer to do so.  Take an antacid 30 to 60 minutes after eating and at bedtime, but not at the same time as an acid blocker. Try not to take medicines such as ibuprofen and aspirin. If you take aspirin for your heart or other health reasons, talk with your healthcare provider about stopping it.   Follow-up care  Follow up with your healthcare provider as advised.   When to seek medical advice  Call your healthcare provider if any of the following occur:  Stomach pain gets worse or moves to the lower right belly (appendix area)  Chest pain appears or gets worse, or spreads to the back, neck, shoulder, or arm  An over-the-counter trial of medicine doesn't relieve your symptoms  Weight loss that can't be explained  Trouble or pain swallowing  Frequent vomiting (can’t keep down liquids)  Blood in the stool or vomit (red or black in color)  Feeling weak or dizzy  Fever of 100.4ºF (38ºC) or higher, or as directed by your healthcare provider  Symptoms get worse or you have new symptoms  [employer] last reviewed this educational content on 11/1/2021 © 2000-2023 [employer]. All rights reserved. This information is not intended as a substitute for professional medical care. Always follow your healthcare professional's instructions.      What Is GERD?  If you often have a painful burning feeling in your chest after you eat, you may have gastroesophageal reflux disease (GERD). Heartburn that keeps coming back is a classic symptom of GERD. But you may have other symptoms as well. A GERD diagnosis is made only after a complete evaluation by your healthcare provider.   Note: Chest pain may also be caused by heart problems. Be sure to have all chest pain evaluated by a  healthcare provider.   When you have a reflux problem  After you eat, food travels from your mouth down the esophagus to your stomach. Along the way, food passes through a 1-way valve called the lower esophageal sphincter (LES). The LES sits at the opening to your stomach. Normally the LES opens when you swallow. It lets food enter the stomach, then closes quickly. With GERD, the LES doesn’t work normally. It lets food and stomach acid flow back (reflux) into the esophagus.      With GERD, the weak LES allows food and fluids to travel back, or reflux, into the esophagus.       Some common symptoms  Frequent heartburn  Frequent burping  Sour- or acid-tasting fluid backing up into your mouth  Symptoms that get worse after you eat, bend over, or lie down  Trouble swallowing or pain when swallowing  A dry, long-term (chronic) cough  Upset stomach (nausea). But nausea and vomiting may be a sign of something else, so be sure to discuss with your provider.     Relieving your discomfort  You and your healthcare provider can work together to find the treatment options that best ease your symptoms. These may include lifestyle changes, medicine, and possibly surgery.   Many people find their GERD symptoms decrease when they eat small frequent meals instead of 3 large ones. Reducing the amount of fatty foods in your diet will also help.    The following foods tend to cause problems for people diagnosed with GERD:   Tomatoes and tomato products  Alcohol  Coffee  Peppermint  Greasy or spicy foods  Acidic foods such as citrus  To ease your symptoms, raise the head of your bed 4 to 6 inches. Don't eat anything within 2 to 3 hours of laying down.   Losing weight, if you are overweight, often eases GERD symptoms.  Stopping smoking can also help GERD symptoms.  Talk with your provider if you don’t understand how to make the dietary changes needed to control your GERD symptoms. Your provider can refer you to a nutritionist.   [employer]  last reviewed this educational content on 9/1/2021  © 8377-5237 [employer]. All rights reserved. This information is not intended as a substitute for professional medical care. Always follow your healthcare professional's instructions.

## 2024-06-10 ENCOUNTER — OFFICE VISIT (OUTPATIENT)
Dept: FAMILY MEDICINE CLINIC | Facility: CLINIC | Age: 54
End: 2024-06-10
Payer: COMMERCIAL

## 2024-06-10 VITALS
RESPIRATION RATE: 18 BRPM | WEIGHT: 238 LBS | HEIGHT: 73 IN | DIASTOLIC BLOOD PRESSURE: 74 MMHG | SYSTOLIC BLOOD PRESSURE: 120 MMHG | OXYGEN SATURATION: 97 % | BODY MASS INDEX: 31.54 KG/M2 | HEART RATE: 67 BPM

## 2024-06-10 DIAGNOSIS — Z71.82 EXERCISE COUNSELING: ICD-10-CM

## 2024-06-10 DIAGNOSIS — Z71.3 DIETARY COUNSELING: ICD-10-CM

## 2024-06-10 DIAGNOSIS — Z23 NEED FOR VACCINATION: ICD-10-CM

## 2024-06-10 DIAGNOSIS — E01.0 THYROMEGALY: ICD-10-CM

## 2024-06-10 DIAGNOSIS — K75.81 NASH (NONALCOHOLIC STEATOHEPATITIS): ICD-10-CM

## 2024-06-10 DIAGNOSIS — E66.09 CLASS 1 OBESITY DUE TO EXCESS CALORIES WITHOUT SERIOUS COMORBIDITY WITH BODY MASS INDEX (BMI) OF 31.0 TO 31.9 IN ADULT: Primary | ICD-10-CM

## 2024-06-10 DIAGNOSIS — Z71.85 VACCINE COUNSELING: ICD-10-CM

## 2024-06-10 DIAGNOSIS — Z79.899 MEDICATION MANAGEMENT: ICD-10-CM

## 2024-06-10 DIAGNOSIS — G47.33 OSA ON CPAP: ICD-10-CM

## 2024-06-10 PROCEDURE — 90472 IMMUNIZATION ADMIN EACH ADD: CPT | Performed by: FAMILY MEDICINE

## 2024-06-10 PROCEDURE — 90746 HEPB VACCINE 3 DOSE ADULT IM: CPT | Performed by: FAMILY MEDICINE

## 2024-06-10 PROCEDURE — 99214 OFFICE O/P EST MOD 30 MIN: CPT | Performed by: FAMILY MEDICINE

## 2024-06-10 PROCEDURE — 90471 IMMUNIZATION ADMIN: CPT | Performed by: FAMILY MEDICINE

## 2024-06-10 PROCEDURE — 90632 HEPA VACCINE ADULT IM: CPT | Performed by: FAMILY MEDICINE

## 2024-06-10 RX ORDER — PHENTERMINE HYDROCHLORIDE 37.5 MG/1
37.5 TABLET ORAL
Qty: 30 TABLET | Refills: 1 | Status: SHIPPED | OUTPATIENT
Start: 2024-06-10

## 2024-06-10 NOTE — PROGRESS NOTES
[facility], [address]  [facility] [address]  Dept: [phone]       Patient:  Dariel Bray  :      1970  MRN:      [id number]    Chief Complaint:    Chief Complaint   Patient presents with    Weight Check       SUBJECTIVE     History of Present Illness:  Dariel is being seen today for a follow-up for weight  and traveling a lot.  Was in [place] last week.  Trying to exercise.  Noted pain in left knee.  Trying not eat red meat.  Eating fish.        Past Medical History:   Past Medical History:    AR (allergic rhinitis)    Esophageal ring    GERD (gastroesophageal reflux disease)    HIGH CHOLESTEROL    not on medication    Hyperlipidemia    Kidney stone    ALVAREZ (nonalcoholic steatohepatitis)    Plantar fasciitis    Sleep apnea    Unspecified sleep apnea    AHI 23.7 SaO2 87% CPAP 8 [facility]          OBJECTIVE     Vitals: /74 (BP Location: Left arm, Patient Position: Sitting, Cuff Size: large)   Pulse 67   Resp 18   Ht 6' 1\" (1.854 m)   Wt 238 lb (108 kg)   SpO2 97%   BMI 31.40 kg/m²     Initial weight loss: 16   Total weight loss: 13   Start weight: 251    Wt Readings from Last 3 Encounters:   06/10/24 238 lb (108 kg)   24 238 lb (108 kg)   24 254 lb (115.2 kg)       Patient Medications:    Current Outpatient Medications   Medication Sig Dispense Refill    Phentermine HCl 37.5 MG Oral Tab Take 1 tablet (37.5 mg total) by mouth every morning before breakfast. 30 tablet 1    Cholecalciferol (VITAMIN D) 50 MCG (2000 UT) Oral Tab Take 1 tablet by mouth daily. Start after completing high dose vitamin D.      PEG 3350-KCl-Na Bicarb-NaCl 420 g Oral Recon Soln Take as directed by physician. 4000 mL 0    loratadine 10 MG Oral Tab Take 1 tablet (10 mg total) by mouth daily.      NUCALA 100 MG/ML Subcutaneous Solution Auto-injector       SYMBICORT 160-4.5 MCG/ACT Inhalation Aerosol INHALE 2 PUFFS TWICE A  DAY *RINSE MOUTH AFTER USE*      Azelastine HCl 0.1 % Nasal Solution 2 sprays by Nasal route 2 (two) times daily.      Fluticasone Propionate 50 MCG/ACT Nasal Suspension 2 sprays by Each Nare route daily. 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   Tobacco Use    Smoking status: Never    Smokeless tobacco: Never   Vaping Use    Vaping status: Never Used   Substance and Sexual Activity    Alcohol use: No    Drug use: No    Sexual activity: Not on file   Other Topics Concern    Not on file   Social History Narrative    Not on file     Social Determinants of Health     Financial Resource Strain: Not on file   Food Insecurity: Not on file   Transportation Needs: Not on file   Physical Activity: Not on file   Stress: Not on file   Social Connections: Not on file   Housing Stability: Not on file     Surgical History:    Past Surgical History:   Procedure Laterality Date    Lithotripsy      Other surgical history      foot    Other surgical history      lasik    Other surgical history      acl    Other surgical history      wrist,right    Other surgical history  10/22/13    Cystoscopy - Dr. Devi     Up gi endoscopy,pete w guide  9/24/13= Esophageal ring    No Jaimes's     Family History:    Family History   Problem Relation Age of Onset    Diabetes Other     Other (Other) Father         copd    Diabetes Mother        Food Journal  Reviewed and Discussed:          Patient has a Food Journal?:  no   Patient is reading nutrition labels?  yes  Average Caloric Intake:          0584-9227  Average CHO Intake:  unsure  Is patient exercising?  Walking; between 1-2 miles x 2 a week    Eating Habits  Patient states the  following:  Eats 2-3 meal(s) per day  Length of time it takes to consume a meal:  20 mins  # of snacks per day: 1          Type of snacks:  nuts  Amount of soda consumption per day:  0  Amount of water (in ounces) per day:  60 oz  Drinking between meals only:  no  Toughest challenge:  travelling       ROS:    Pertinent items are noted in HPI.  A comprehensive review of systems was negative.  All other systems were reviewed and are negative    Physical Exam:   /74 (BP Location: Left arm, Patient Position: Sitting, Cuff Size: large)   Pulse 67   Resp 18   Ht 6' 1\" (1.854 m)   Wt 238 lb (108 kg)   SpO2 97%   BMI 31.40 kg/m²   General appearance: alert, appears stated age, and cooperative  Head: Normocephalic, without obvious abnormality, atraumatic  Neck: no adenopathy, no carotid bruit, no JVD, supple, symmetrical, trachea midline, and thyroid mildy enlarged, symmetric, no tenderness/mass/nodules  Lungs: clear to auscultation bilaterally  Heart: S1, S2 normal, no murmur, click, rub or gallop, regular rate and rhythm  Abdomen: soft, non-tender; bowel sounds normal; no masses,  no organomegaly  Extremities: extremities normal, atraumatic, no cyanosis or edema  Skin: Skin color, texture, turgor normal. No rashes or lesions    ASSESSMENT/PLAN     Encounter Diagnoses   Name Primary?    Class 1 obesity due to excess calories without serious comorbidity with body mass index (BMI) of 31.0 to 31.9 in adult Yes    Dietary counseling     Exercise counseling     JP on CPAP     ALVAREZ (nonalcoholic steatohepatitis)     Need for vaccination     Vaccine counseling     Thyromegaly     Medication management        Orders Placed This Encounter   Procedures    Hep A, Adult [85982]    Hep B, Adult [95775]    Hep B, Adult [15158]       Nutritional Goals  Calorie-controlled diet:  2000   Doing light beer.  Aware of what he is eating.  Cutting back on red meat.  Cutting back on snacking.  Eating Oatmeal in the  morning.      Behavior Modifications Reviewed and Discussed  Drink 64 oz of water per day, Utlize portion control strategies to reduce calorie intake, Identify triggers for eating and manage cues, and Eat slowly and take 20 to 30 minutes to complete each meal    Exercise Goals Reviewed and Discussed    Walk for  30 Minutes  Follow up with ortho or left knee discomfort.  Dr. Luis Norwood  [facility]      Meds & Refills for this Visit:  Requested Prescriptions     Signed Prescriptions Disp Refills    Phentermine HCl 37.5 MG Oral Tab 30 tablet 1     Sig: Take 1 tablet (37.5 mg total) by mouth every morning before breakfast.       Imaging & Consults:  HEPATITIS A VACCINE  HEPATITIS B VACCINE, OVER 20  HEPATITIS B VACCINE, OVER 20  US THYROID (CPT=76536)    On 1/2 phentermine 37.5 mg tablet daily.  Probable due to constipation and stomach upset.  Advised labs.  Given Hep A and B today.  Advised COVID.  Advised PVS.  Saw Dr Fajardo for his JP and doing well on Cpap.  Will see ortho for his left knee.  Advised thyroid ultrasound.        Return in about 4 weeks (around 7/8/2024) for OM -15.

## 2024-06-10 NOTE — PROGRESS NOTES
Food Journal  Reviewed and Discussed:          Patient has a Food Journal?:  no   Patient is reading nutrition labels?  yes  Average Caloric Intake:          3248-2874  Average CHO Intake:  unsure  Is patient exercising?  Walking; between 1-2 miles x 2 a week    Eating Habits  Patient states the following:  Eats 2-3 meal(s) per day  Length of time it takes to consume a meal:  20 mins  # of snacks per day: 1          Type of snacks:  nuts  Amount of soda consumption per day:  0  Amount of water (in ounces) per day:  60 oz  Drinking between meals only:  no  Toughest challenge:  travelling

## 2024-06-18 ENCOUNTER — HOSPITAL ENCOUNTER (OUTPATIENT)
Dept: ULTRASOUND IMAGING | Facility: HOSPITAL | Age: 54
Discharge: HOME | End: 2024-06-18
Attending: FAMILY MEDICINE

## 2024-06-18 DIAGNOSIS — E01.0 THYROMEGALY: ICD-10-CM

## 2024-06-18 PROCEDURE — 76536 US EXAM OF HEAD AND NECK: CPT | Performed by: FAMILY MEDICINE

## 2024-07-08 PROBLEM — Z12.11 SPECIAL SCREENING FOR MALIGNANT NEOPLASM OF COLON: Status: ACTIVE | Noted: 2024-07-08

## 2024-07-15 ENCOUNTER — OFFICE VISIT (OUTPATIENT)
Dept: FAMILY MEDICINE CLINIC | Facility: CLINIC | Age: 54
End: 2024-07-15
Payer: COMMERCIAL

## 2024-07-15 VITALS
HEIGHT: 73 IN | HEART RATE: 78 BPM | WEIGHT: 237.13 LBS | DIASTOLIC BLOOD PRESSURE: 72 MMHG | RESPIRATION RATE: 18 BRPM | SYSTOLIC BLOOD PRESSURE: 130 MMHG | BODY MASS INDEX: 31.43 KG/M2 | OXYGEN SATURATION: 97 %

## 2024-07-15 DIAGNOSIS — Z71.82 EXERCISE COUNSELING: ICD-10-CM

## 2024-07-15 DIAGNOSIS — Z71.3 DIETARY COUNSELING: ICD-10-CM

## 2024-07-15 DIAGNOSIS — G47.33 OSA ON CPAP: ICD-10-CM

## 2024-07-15 DIAGNOSIS — J82.83 EOSINOPHILIC ASTHMA (HCC): ICD-10-CM

## 2024-07-15 DIAGNOSIS — J30.9 ALLERGIC RHINITIS, UNSPECIFIED SEASONALITY, UNSPECIFIED TRIGGER: ICD-10-CM

## 2024-07-15 DIAGNOSIS — K75.81 NASH (NONALCOHOLIC STEATOHEPATITIS): ICD-10-CM

## 2024-07-15 DIAGNOSIS — Z79.899 MEDICATION MANAGEMENT: ICD-10-CM

## 2024-07-15 DIAGNOSIS — E66.09 CLASS 1 OBESITY DUE TO EXCESS CALORIES WITHOUT SERIOUS COMORBIDITY WITH BODY MASS INDEX (BMI) OF 31.0 TO 31.9 IN ADULT: Primary | ICD-10-CM

## 2024-07-15 RX ORDER — AZELASTINE 1 MG/ML
2 SPRAY, METERED NASAL 2 TIMES DAILY
Qty: 90 ML | Refills: 0 | Status: SHIPPED | OUTPATIENT
Start: 2024-07-15

## 2024-07-15 RX ORDER — AZELASTINE 1 MG/ML
2 SPRAY, METERED NASAL 2 TIMES DAILY
Qty: 1 EACH | Refills: 1 | Status: SHIPPED | OUTPATIENT
Start: 2024-07-15 | End: 2024-07-15

## 2024-07-15 RX ORDER — BUDESONIDE AND FORMOTEROL FUMARATE DIHYDRATE 160; 4.5 UG/1; UG/1
2 AEROSOL RESPIRATORY (INHALATION) 2 TIMES DAILY
Qty: 1 EACH | Refills: 0 | Status: SHIPPED | OUTPATIENT
Start: 2024-07-15

## 2024-07-15 NOTE — PROGRESS NOTES
Food Journal  Reviewed and Discussed:          Patient has a Food Journal?:  no   Patient is reading nutrition labels?  yes  Average Caloric Intake:          3259-3228  Average CHO Intake:  unsure  Is patient exercising?  Walking; between 1-2 miles x 2 a week    Eating Habits  Patient states the following:  Eats 2-3 meal(s) per day  Length of time it takes to consume a meal:  20 mins  # of snacks per day: 1          Type of snacks:  nuts. Dried fruit   Amount of soda consumption per day:  0  Amount of water (in ounces) per day:  60 oz  Drinking between meals only:  no  Toughest challenge:  Nothing at the moment feels he is doing well. Not over indulging at one sitting

## 2024-07-15 NOTE — H&P
[facility], [address] [address]  Dept: [phone]       Patient:  Dariel Bray  :      1970  MRN:      [id number]    Chief Complaint:    Chief Complaint   Patient presents with    Weight Check       SUBJECTIVE     History of Present Illness:  Dariel is being seen today for a follow-up for weight  and traveling a lot.  Left knee pain is still bothering him.        Past Medical History:   Past Medical History:    Abdominal hernia    AR (allergic rhinitis)    Bloating    Calculus of kidney    Chest pain    Esophageal ring    GERD (gastroesophageal reflux disease)    Heartburn    HIGH CHOLESTEROL    not on medication    Hyperlipidemia    Kidney stone    ALVAREZ (nonalcoholic steatohepatitis)    Plantar fasciitis    Problems with swallowing    Sleep apnea    Unspecified sleep apnea    AHI 23.7 SaO2 87% CPAP 8 [facility]          OBJECTIVE     Vitals: /72 (BP Location: Left arm, Patient Position: Sitting, Cuff Size: large)   Pulse 78   Resp 18   Ht 6' 1\" (1.854 m)   Wt 237 lb 2 oz (107.6 kg)   SpO2 97%   BMI 31.28 kg/m²     Initial weight loss: 1   Total weight loss: 14   Start weight: 251    Wt Readings from Last 3 Encounters:   07/15/24 237 lb 2 oz (107.6 kg)   24 237 lb (107.5 kg)   06/10/24 238 lb (108 kg)       Patient Medications:    Current Outpatient Medications   Medication Sig Dispense Refill    SYMBICORT 160-4.5 MCG/ACT Inhalation Aerosol Inhale 2 puffs into the lungs 2 (two) times daily. 1 each 0    azelastine 0.1 % Nasal Solution 2 sprays by Nasal route 2 (two) times daily. 1 each 1    Phentermine HCl 37.5 MG Oral Tab Take 1 tablet (37.5 mg total) by mouth every morning before breakfast. 30 tablet 1    Cholecalciferol (VITAMIN D) 50 MCG (2000 UT) Oral Tab Take 1 tablet by mouth daily. Start after completing high dose vitamin D.      loratadine 10 MG Oral Tab Take 1 tablet (10 mg total)  by mouth daily.      NUCALA 100 MG/ML Subcutaneous Solution Auto-injector       Fluticasone Propionate 50 MCG/ACT Nasal Suspension 2 sprays by Each Nare route daily. 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   Tobacco Use    Smoking status: Never    Smokeless tobacco: Never   Vaping Use    Vaping status: Never Used   Substance and Sexual Activity    Alcohol use: Yes     Alcohol/week: 3.0 standard drinks of alcohol     Types: 3 Glasses of wine per week    Drug use: No    Sexual activity: Not on file   Other Topics Concern    Not on file   Social History Narrative    Not on file     Social Determinants of Health     Financial Resource Strain: Not on file   Food Insecurity: Not on file   Transportation Needs: Not on file   Physical Activity: Not on file   Stress: Not on file   Social Connections: Not on file   Housing Stability: Not on file     Surgical History:    Past Surgical History:   Procedure Laterality Date    Lithotripsy      Other surgical history      foot    Other surgical history      lasik    Other surgical history      acl    Other surgical history      wrist,right    Other surgical history  10/22/13    Cystoscopy - Dr. Devi     Up gi endoscopy,pete w guide  9/24/13= Esophageal ring    No Jaimes's     Family History:    Family History   Problem Relation Age of Onset    Diabetes Other     Other (Other) Father         copd    Diabetes Mother      Food Journal  Reviewed and Discussed:          Patient has a Food Journal?:  no   Patient is reading nutrition labels?  yes  Average Caloric Intake:          5529-8820  Average CHO Intake:  unsure  Is patient exercising?  Walking; between 1-2 miles x 2 a week     Eating Habits  Patient states the following:  Eats 2-3 meal(s) per day  Length of time it takes to consume a meal:  20 mins  # of snacks per day: 1          Type of snacks:  nuts. Dried fruit   Amount of soda consumption per day:  0  Amount of water (in ounces) per day:  60 oz  Drinking between meals only:  no  Toughest challenge:  Nothing at the moment feels he is doing well. Not over indulging at one sitting       ROS:    Pertinent items are noted in HPI.  A comprehensive review of systems was negative.  All other systems were reviewed and are negative    Physical Exam:   /72 (BP Location: Left arm, Patient Position: Sitting, Cuff Size: large)   Pulse 78   Resp 18   Ht 6' 1\" (1.854 m)   Wt 237 lb 2 oz (107.6 kg)   SpO2 97%   BMI 31.28 kg/m²   General appearance: alert, appears stated age, and cooperative  Head: Normocephalic, without obvious abnormality, atraumatic  Neck: no adenopathy, no carotid bruit, no JVD, supple, symmetrical, trachea midline, and thyroid mildy enlarged, symmetric, no tenderness/mass/nodules  Lungs: clear to auscultation bilaterally  Heart: S1, S2 normal, no murmur, click, rub or gallop, regular rate and rhythm  Abdomen: soft, non-tender; bowel sounds normal; no masses,  no organomegaly  Extremities: extremities normal, atraumatic, no cyanosis or edema  Skin: Skin color, texture, turgor normal. No rashes or lesions    ASSESSMENT/PLAN     Encounter Diagnoses   Name Primary?    Class 1 obesity due to excess calories without serious comorbidity with body mass index (BMI) of 31.0 to 31.9 in adult Yes    Dietary counseling     Exercise counseling     Allergic rhinitis, unspecified seasonality, unspecified trigger     Eosinophilic asthma (HCC)     JP on CPAP     ALVAREZ (nonalcoholic steatohepatitis)     Medication management        No orders of the defined types were placed in this encounter.      Nutritional Goals  Calorie-controlled diet:  2000   Doing light beer.  Aware of what he  is eating.  Cutting back on red meat.  Cutting back on snacking.  Eating Oatmeal in the morning.      Behavior Modifications Reviewed and Discussed  Drink 64 oz of water per day, Utlize portion control strategies to reduce calorie intake, Identify triggers for eating and manage cues, and Eat slowly and take 20 to 30 minutes to complete each meal    Exercise Goals Reviewed and Discussed    Walk for  30 Minutes    Follow up with ortho or left knee discomfort.  Dr. Luis Norwood  [facility]      Meds & Refills for this Visit:  Requested Prescriptions     Signed Prescriptions Disp Refills    SYMBICORT 160-4.5 MCG/ACT Inhalation Aerosol 1 each 0     Sig: Inhale 2 puffs into the lungs 2 (two) times daily.    azelastine 0.1 % Nasal Solution 1 each 1     Si sprays by Nasal route 2 (two) times daily.       Imaging & Consults:  None      ON phentermine full tab.  Monitor BP.  Saw Dr Fajardo for his JP and doing well on Cpap.  Will see ortho for his left knee.        Return in about 4 weeks (around 2024) for OM -15.

## 2024-07-26 DIAGNOSIS — E66.09 CLASS 1 OBESITY DUE TO EXCESS CALORIES WITHOUT SERIOUS COMORBIDITY WITH BODY MASS INDEX (BMI) OF 31.0 TO 31.9 IN ADULT: ICD-10-CM

## 2024-07-26 DIAGNOSIS — Z71.82 EXERCISE COUNSELING: ICD-10-CM

## 2024-07-26 DIAGNOSIS — Z71.3 DIETARY COUNSELING: ICD-10-CM

## 2024-07-26 DIAGNOSIS — J82.83 EOSINOPHILIC ASTHMA (HCC): ICD-10-CM

## 2024-07-26 RX ORDER — BUDESONIDE AND FORMOTEROL FUMARATE DIHYDRATE 160; 4.5 UG/1; UG/1
2 AEROSOL RESPIRATORY (INHALATION) 2 TIMES DAILY
Qty: 1 EACH | Refills: 0 | Status: CANCELLED | OUTPATIENT
Start: 2024-07-26

## 2024-07-26 RX ORDER — PHENTERMINE HYDROCHLORIDE 37.5 MG/1
37.5 TABLET ORAL
Qty: 30 TABLET | Refills: 1 | Status: SHIPPED | OUTPATIENT
Start: 2024-07-26

## 2024-07-26 NOTE — TELEPHONE ENCOUNTER
Last OV relevant to medication: 7/15/24  Last refill date: 6/10/24     #/refills: 30  When pt was asked to return for OV: 4 weeks  Upcoming appt/reason: 8/12/24  Was pt informed of any over due labs: was due April-aware  By the way he's leaving for [place] tomorrow and is out of these.

## 2024-07-26 NOTE — TELEPHONE ENCOUNTER
Last fill phentermine 6/10/24 #30 per   Symbicort was sent to [facility] on 7/15/24. Per , he has not picked up.

## 2024-08-02 ENCOUNTER — TELEPHONE (OUTPATIENT)
Dept: OTHER | Facility: HOSPITAL | Age: 54
End: 2024-08-02

## 2024-08-02 ENCOUNTER — PATIENT MESSAGE (OUTPATIENT)
Dept: FAMILY MEDICINE CLINIC | Facility: CLINIC | Age: 54
End: 2024-08-02

## 2024-08-02 NOTE — PROGRESS NOTES
Patient scheduled for the Heart Scan for 8/2/24.  Patient completed the Heart Scan on 11/19/2021 with a Calcium score 0.0.  Recommendation to repeat would be in at least 5 years, unless indicated by his physician to repeat sooner.   I spoke with patient and his physician had not directed him to repeat the Heart Scan at this time.  He agreed to cancel the appointment and will discuss with his PCP.  If she recommends repeating he will reschedule.

## 2024-09-17 ENCOUNTER — OFFICE VISIT (OUTPATIENT)
Dept: FAMILY MEDICINE CLINIC | Facility: CLINIC | Age: 54
End: 2024-09-17
Payer: COMMERCIAL

## 2024-09-17 VITALS
HEART RATE: 79 BPM | WEIGHT: 236.25 LBS | OXYGEN SATURATION: 97 % | HEIGHT: 73 IN | SYSTOLIC BLOOD PRESSURE: 132 MMHG | DIASTOLIC BLOOD PRESSURE: 74 MMHG | BODY MASS INDEX: 31.31 KG/M2 | RESPIRATION RATE: 18 BRPM

## 2024-09-17 DIAGNOSIS — Z71.3 DIETARY COUNSELING: ICD-10-CM

## 2024-09-17 DIAGNOSIS — Z71.82 EXERCISE COUNSELING: ICD-10-CM

## 2024-09-17 DIAGNOSIS — E55.9 VITAMIN D DEFICIENCY: ICD-10-CM

## 2024-09-17 DIAGNOSIS — J82.83 EOSINOPHILIC ASTHMA (HCC): ICD-10-CM

## 2024-09-17 DIAGNOSIS — E66.09 CLASS 1 OBESITY DUE TO EXCESS CALORIES WITHOUT SERIOUS COMORBIDITY WITH BODY MASS INDEX (BMI) OF 31.0 TO 31.9 IN ADULT: Primary | ICD-10-CM

## 2024-09-17 DIAGNOSIS — K75.81 NASH (NONALCOHOLIC STEATOHEPATITIS): ICD-10-CM

## 2024-09-17 DIAGNOSIS — Z23 NEED FOR VACCINATION: ICD-10-CM

## 2024-09-17 DIAGNOSIS — Z79.899 MEDICATION MANAGEMENT: ICD-10-CM

## 2024-09-17 DIAGNOSIS — E78.2 MIXED HYPERLIPIDEMIA: ICD-10-CM

## 2024-09-17 DIAGNOSIS — Z71.85 VACCINE COUNSELING: ICD-10-CM

## 2024-09-17 PROCEDURE — 99214 OFFICE O/P EST MOD 30 MIN: CPT | Performed by: FAMILY MEDICINE

## 2024-09-17 RX ORDER — BUDESONIDE AND FORMOTEROL FUMARATE DIHYDRATE 160; 4.5 UG/1; UG/1
2 AEROSOL RESPIRATORY (INHALATION) 2 TIMES DAILY
Qty: 3 EACH | Refills: 1 | Status: SHIPPED | OUTPATIENT
Start: 2024-09-17

## 2024-09-17 RX ORDER — PHENTERMINE HYDROCHLORIDE 37.5 MG/1
37.5 TABLET ORAL
Qty: 30 TABLET | Refills: 1 | Status: SHIPPED | OUTPATIENT
Start: 2024-09-17

## 2024-09-17 NOTE — PROGRESS NOTES
[facility], [address] [address]  Dept: [phone]       Patient:  Dariel Bray  :      1970  MRN:      [id number]    Chief Complaint:    Chief Complaint   Patient presents with    Weight Check       SUBJECTIVE     History of Present Illness:  Dariel is being seen today for a follow-up for weight.        Past Medical History:   Past Medical History:    Abdominal hernia    AR (allergic rhinitis)    Bloating    Calculus of kidney    Chest pain    Esophageal ring    GERD (gastroesophageal reflux disease)    Heartburn    HIGH CHOLESTEROL    not on medication    Hyperlipidemia    Kidney stone    ALVAREZ (nonalcoholic steatohepatitis)    Plantar fasciitis    Problems with swallowing    Sleep apnea    Unspecified sleep apnea    AHI 23.7 SaO2 87% CPAP 8 [facility]          OBJECTIVE     Vitals: /74 (BP Location: Right arm, Patient Position: Sitting, Cuff Size: large)   Pulse 79   Resp 18   Ht 6' 1\" (1.854 m)   Wt 236 lb 4 oz (107.2 kg)   SpO2 97%   BMI 31.17 kg/m²     Initial weight loss: 1   Total weight loss: 15   Start weight: 251    Wt Readings from Last 3 Encounters:   24 236 lb 4 oz (107.2 kg)   07/15/24 237 lb 2 oz (107.6 kg)   24 237 lb (107.5 kg)       Patient Medications:    Current Outpatient Medications   Medication Sig Dispense Refill    SYMBICORT 160-4.5 MCG/ACT Inhalation Aerosol Inhale 2 puffs into the lungs 2 (two) times daily. 3 each 1    Phentermine HCl 37.5 MG Oral Tab Take 1 tablet (37.5 mg total) by mouth every morning before breakfast. 30 tablet 1    AZELASTINE 0.1 % Nasal Solution USE 2 SPRAYS IN EACH NOSTRIL TWICE DAILY 90 mL 0    NUCALA 100 MG/ML Subcutaneous Solution Auto-injector       Fluticasone Propionate 50 MCG/ACT Nasal Suspension 2 sprays by Each Nare route daily. 3 Bottle 3     Allergies:  Pcn [bicillin l-a]     Social History:    Social History      Socioeconomic History    Marital status:      Spouse name: Not on file    Number of children: 3    Years of education: Not on file    Highest education level: Not on file   Occupational History    Occupation: sales     Comment: [employer] bills   Tobacco Use    Smoking status: Never    Smokeless tobacco: Never   Vaping Use    Vaping status: Never Used   Substance and Sexual Activity    Alcohol use: Yes     Alcohol/week: 3.0 standard drinks of alcohol     Types: 3 Glasses of wine per week    Drug use: No    Sexual activity: Not on file   Other Topics Concern    Not on file   Social History Narrative    Not on file     Social Determinants of Health     Financial Resource Strain: Not on file   Food Insecurity: Not on file   Transportation Needs: Not on file   Physical Activity: Not on file   Stress: Not on file   Social Connections: Not on file   Housing Stability: Not on file     Surgical History:    Past Surgical History:   Procedure Laterality Date    Lithotripsy      Other surgical history      foot    Other surgical history      lasik    Other surgical history      acl    Other surgical history      wrist,right    Other surgical history  10/22/13    Cystoscopy - Dr. Devi     Up gi endoscopy,pete mason guide  9/24/13= Esophageal ring    No Jaimes's     Family History:    Family History   Problem Relation Age of Onset    Diabetes Other     Other (Other) Father         copd    Diabetes Mother      Food Journal  Reviewed and Discussed:          Patient has a Food Journal?:  yes has started to on roxana   Patient is reading nutrition labels?  yes  Average Caloric Intake:          9346-5985  Average CHO Intake:  unsure  Is patient exercising?  Walking; between 1-2 miles x 2 a week      Eating Habits  Patient states the following:  Eats 2 meal(s) per day  Length of time it takes to consume a meal:  20 mins  # of snacks per day: 1          Type of snacks:  nuts, dried fruit, banana   Amount of soda  consumption per day:  0  Amount of water (in ounces) per day:  60 oz  Drinking between meals only:  no  Toughest challenge: traveling     ROS:    Pertinent items are noted in HPI.  A comprehensive review of systems was negative.  All other systems were reviewed and are negative    Physical Exam:   /74 (BP Location: Right arm, Patient Position: Sitting, Cuff Size: large)   Pulse 79   Resp 18   Ht 6' 1\" (1.854 m)   Wt 236 lb 4 oz (107.2 kg)   SpO2 97%   BMI 31.17 kg/m²   General appearance: alert, appears stated age, and cooperative  Head: Normocephalic, without obvious abnormality, atraumatic  Neck: no adenopathy, no carotid bruit, no JVD, supple, symmetrical, trachea midline, and thyroid mildy enlarged, symmetric, no tenderness/mass/nodules  Lungs: clear to auscultation bilaterally  Heart: S1, S2 normal, no murmur, click, rub or gallop, regular rate and rhythm  Abdomen: soft, non-tender; bowel sounds normal; no masses,  no organomegaly  Extremities: extremities normal, atraumatic, no cyanosis or edema  Skin: Skin color, texture, turgor normal. No rashes or lesions    ASSESSMENT/PLAN     Encounter Diagnoses   Name Primary?    Class 1 obesity due to excess calories without serious comorbidity with body mass index (BMI) of 31.0 to 31.9 in adult Yes    Mixed hyperlipidemia     ALVAREZ (nonalcoholic steatohepatitis)     Vitamin D deficiency     Dietary counseling     Exercise counseling     Medication management     Vaccine counseling     Need for vaccination     Eosinophilic asthma (HCC)          No orders of the defined types were placed in this encounter.      Nutritional Goals  Calorie-controlled diet:  2000   Doing light beer.  Aware of what he is eating.  Cutting back on red meat.  Cutting back on snacking.  Eating Oatmeal in the morning.      Behavior Modifications Reviewed and Discussed  Drink 64 oz of water per day, Utlize portion control strategies to reduce calorie intake, Identify triggers for eating  and manage cues, and Eat slowly and take 20 to 30 minutes to complete each meal    Exercise Goals Reviewed and Discussed    Walk for  30 Minutes    Follow up with ortho or left knee discomfort.  Dr. Luis Norwood  [facility]      Meds & Refills for this Visit:  Requested Prescriptions     Signed Prescriptions Disp Refills    SYMBICORT 160-4.5 MCG/ACT Inhalation Aerosol 3 each 1     Sig: Inhale 2 puffs into the lungs 2 (two) times daily.    Phentermine HCl 37.5 MG Oral Tab 30 tablet 1     Sig: Take 1 tablet (37.5 mg total) by mouth every morning before breakfast.       Imaging & Consults:  None      ON phentermine full tab.  Monitor BP.  Schedule physical for 2/2025.  Advised  Hep B #2 today.  Advised COVID and flu.  Labs advised.  Advised US liver.  Will do [facility] and [facility] -- pt. Aware he did the last one in 2021.          Return in about 4 weeks (around 10/15/2024) for OM -15.

## 2024-09-17 NOTE — PROGRESS NOTES
Food Journal  Reviewed and Discussed:          Patient has a Food Journal?:  yes has started to on roxana   Patient is reading nutrition labels?  yes  Average Caloric Intake:          2597-8242  Average CHO Intake:  unsure  Is patient exercising?  Walking; between 1-2 miles x 2 a week      Eating Habits  Patient states the following:  Eats 2 meal(s) per day  Length of time it takes to consume a meal:  20 mins  # of snacks per day: 1          Type of snacks:  nuts, dried fruit, banana   Amount of soda consumption per day:  0  Amount of water (in ounces) per day:  60 oz  Drinking between meals only:  no  Toughest challenge: traveling

## 2024-11-15 DIAGNOSIS — Z71.82 EXERCISE COUNSELING: ICD-10-CM

## 2024-11-15 DIAGNOSIS — J82.83 EOSINOPHILIC ASTHMA (HCC): ICD-10-CM

## 2024-11-15 DIAGNOSIS — E66.811 CLASS 1 OBESITY DUE TO EXCESS CALORIES WITHOUT SERIOUS COMORBIDITY WITH BODY MASS INDEX (BMI) OF 31.0 TO 31.9 IN ADULT: ICD-10-CM

## 2024-11-15 DIAGNOSIS — E66.09 CLASS 1 OBESITY DUE TO EXCESS CALORIES WITHOUT SERIOUS COMORBIDITY WITH BODY MASS INDEX (BMI) OF 31.0 TO 31.9 IN ADULT: ICD-10-CM

## 2024-11-15 DIAGNOSIS — Z71.3 DIETARY COUNSELING: ICD-10-CM

## 2024-11-15 RX ORDER — PHENTERMINE HYDROCHLORIDE 37.5 MG/1
37.5 TABLET ORAL
Qty: 30 TABLET | Refills: 1 | Status: CANCELLED | OUTPATIENT
Start: 2024-11-15

## 2024-11-15 RX ORDER — BUDESONIDE AND FORMOTEROL FUMARATE DIHYDRATE 160; 4.5 UG/1; UG/1
2 AEROSOL RESPIRATORY (INHALATION) 2 TIMES DAILY
Qty: 3 EACH | Refills: 1 | Status: CANCELLED | OUTPATIENT
Start: 2024-11-15

## 2024-11-18 ENCOUNTER — OFFICE VISIT (OUTPATIENT)
Dept: FAMILY MEDICINE CLINIC | Facility: CLINIC | Age: 54
End: 2024-11-18
Payer: COMMERCIAL

## 2024-11-18 VITALS
HEIGHT: 73 IN | DIASTOLIC BLOOD PRESSURE: 72 MMHG | SYSTOLIC BLOOD PRESSURE: 124 MMHG | HEART RATE: 72 BPM | BODY MASS INDEX: 31.68 KG/M2 | OXYGEN SATURATION: 97 % | RESPIRATION RATE: 18 BRPM | WEIGHT: 239 LBS

## 2024-11-18 DIAGNOSIS — Z23 NEED FOR VACCINATION: ICD-10-CM

## 2024-11-18 DIAGNOSIS — Z79.899 MEDICATION MANAGEMENT: ICD-10-CM

## 2024-11-18 DIAGNOSIS — Z71.3 DIETARY COUNSELING: ICD-10-CM

## 2024-11-18 DIAGNOSIS — J82.83 EOSINOPHILIC ASTHMA (HCC): ICD-10-CM

## 2024-11-18 DIAGNOSIS — M25.532 BILATERAL WRIST PAIN: ICD-10-CM

## 2024-11-18 DIAGNOSIS — M25.562 LEFT KNEE PAIN, UNSPECIFIED CHRONICITY: ICD-10-CM

## 2024-11-18 DIAGNOSIS — Z71.85 VACCINE COUNSELING: ICD-10-CM

## 2024-11-18 DIAGNOSIS — M25.531 BILATERAL WRIST PAIN: ICD-10-CM

## 2024-11-18 DIAGNOSIS — Z71.82 EXERCISE COUNSELING: ICD-10-CM

## 2024-11-18 DIAGNOSIS — E66.811 CLASS 1 OBESITY DUE TO EXCESS CALORIES WITHOUT SERIOUS COMORBIDITY WITH BODY MASS INDEX (BMI) OF 31.0 TO 31.9 IN ADULT: Primary | ICD-10-CM

## 2024-11-18 DIAGNOSIS — E66.09 CLASS 1 OBESITY DUE TO EXCESS CALORIES WITHOUT SERIOUS COMORBIDITY WITH BODY MASS INDEX (BMI) OF 31.0 TO 31.9 IN ADULT: Primary | ICD-10-CM

## 2024-11-18 RX ORDER — BUDESONIDE AND FORMOTEROL FUMARATE DIHYDRATE 160; 4.5 UG/1; UG/1
2 AEROSOL RESPIRATORY (INHALATION) 2 TIMES DAILY
Qty: 3 EACH | Refills: 1 | Status: SHIPPED | OUTPATIENT
Start: 2024-11-18

## 2024-11-18 RX ORDER — PHENTERMINE HYDROCHLORIDE 37.5 MG/1
37.5 TABLET ORAL
Qty: 30 TABLET | Refills: 1 | Status: SHIPPED | OUTPATIENT
Start: 2024-11-18

## 2024-11-18 NOTE — PROGRESS NOTES
[facility], [address] [address]  Dept: [phone]       Patient:  Dariel Bray  :      1970  MRN:      [id number]    Chief Complaint:    Chief Complaint   Patient presents with    Weight Check       SUBJECTIVE     History of Present Illness:  Dariel is being seen today for a follow-up for weight.  Pt. Feels more depresed with recent weather and weight is up 5 lbs.  PT. Complains of left knee pain while playing gold and will see his ortho.  Sleeping with  for his wrist.  Had right injury from the cuffs when arrested after a wedding and it was bloody and feel that irritated his right wrist discomfort.  May try tens unit on it and see if it helps.  Right elbow was better with tens and heat and biofreeze.  RIght bothering him more than left and would like to monitor for now.  Too busy for PT now.              Past Medical History:   Past Medical History:    Abdominal hernia    AR (allergic rhinitis)    Bloating    Calculus of kidney    Chest pain    Esophageal ring    GERD (gastroesophageal reflux disease)    Heartburn    HIGH CHOLESTEROL    not on medication    Hyperlipidemia    Kidney stone    ALVAREZ (nonalcoholic steatohepatitis)    Plantar fasciitis    Problems with swallowing    Sleep apnea    Unspecified sleep apnea    AHI 23.7 SaO2 87% CPAP 8 [facility]          OBJECTIVE     Vitals: /72 (BP Location: Left arm, Patient Position: Sitting, Cuff Size: adult)   Pulse 72   Resp 18   Ht 6' 1\" (1.854 m)   Wt 239 lb (108.4 kg)   SpO2 97%   BMI 31.53 kg/m²     Initial weight loss: 1   Total weight loss: 15   Start weight: 251    Wt Readings from Last 3 Encounters:   24 239 lb (108.4 kg)   24 236 lb 4 oz (107.2 kg)   07/15/24 237 lb 2 oz (107.6 kg)       Patient Medications:    Current Outpatient Medications   Medication Sig Dispense Refill    Phentermine HCl 37.5 MG Oral Tab Take  1 tablet (37.5 mg total) by mouth every morning before breakfast. 30 tablet 1    SYMBICORT 160-4.5 MCG/ACT Inhalation Aerosol Inhale 2 puffs into the lungs 2 (two) times daily. 3 each 1    AZELASTINE 0.1 % Nasal Solution USE 2 SPRAYS IN EACH NOSTRIL TWICE DAILY 90 mL 0    NUCALA 100 MG/ML Subcutaneous Solution Auto-injector       Fluticasone Propionate 50 MCG/ACT Nasal Suspension 2 sprays by Each Nare route daily. 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   Tobacco Use    Smoking status: Never    Smokeless tobacco: Never   Vaping Use    Vaping status: Never Used   Substance and Sexual Activity    Alcohol use: Yes     Alcohol/week: 3.0 standard drinks of alcohol     Types: 3 Glasses of wine per week    Drug use: No    Sexual activity: Not on file   Other Topics Concern    Not on file   Social History Narrative    Not on file     Social Drivers of Health     Financial Resource Strain: Not on file   Food Insecurity: Not on file   Transportation Needs: Not on file   Physical Activity: Not on file   Stress: Not on file   Social Connections: Not on file   Housing Stability: Not on file     Surgical History:    Past Surgical History:   Procedure Laterality Date    Lithotripsy      Other surgical history      foot    Other surgical history      lasik    Other surgical history      acl    Other surgical history      wrist,right    Other surgical history  10/22/13    Cystoscopy - Dr. Devi     Up gi endoscopy,pete w guide  9/24/13= Esophageal ring    No Jaimes's     Family History:    Family History   Problem Relation Age of Onset    Diabetes Other     Other (Other) Father         copd    Diabetes Mother      Food  Journal  Reviewed and Discussed:          Patient has a Food Journal?:  yes has started to on roxana   Patient is reading nutrition labels?  yes  Average Caloric Intake:          0700-8071  Average CHO Intake:  unsure  Is patient exercising?  Walking; between 1-2 miles x 2 a week      Eating Habits  Patient states the following:  Eats 2 meal(s) per day  Length of time it takes to consume a meal:  20 mins  # of snacks per day: 1          Type of snacks:  nuts, dried fruit, banana   Amount of soda consumption per day:  0  Amount of water (in ounces) per day:  60 oz  Drinking between meals only:  no  Toughest challenge: traveling     ROS:    Pertinent items are noted in HPI.  A comprehensive review of systems was negative.  All other systems were reviewed and are negative    Physical Exam:   /72 (BP Location: Left arm, Patient Position: Sitting, Cuff Size: adult)   Pulse 72   Resp 18   Ht 6' 1\" (1.854 m)   Wt 239 lb (108.4 kg)   SpO2 97%   BMI 31.53 kg/m²   General appearance: alert, appears stated age, and cooperative  Head: Normocephalic, without obvious abnormality, atraumatic  Neck: no adenopathy, no carotid bruit, no JVD, supple, symmetrical, trachea midline, and thyroid mildy enlarged, symmetric, no tenderness/mass/nodules  Lungs: clear to auscultation bilaterally  Heart: S1, S2 normal, no murmur, click, rub or gallop, regular rate and rhythm  Abdomen: soft, non-tender; bowel sounds normal; no masses,  no organomegaly  Extremities: extremities normal, atraumatic, no cyanosis or edema  Skin: Skin color, texture, turgor normal. No rashes or lesions    ASSESSMENT/PLAN     Encounter Diagnoses   Name Primary?    Class 1 obesity due to excess calories without serious comorbidity with body mass index (BMI) of 31.0 to 31.9 in adult Yes    Dietary counseling     Exercise counseling     Left knee pain, unspecified chronicity     Bilateral wrist pain     Eosinophilic asthma (HCC)     Medication management      Vaccine counseling     Need for vaccination            Orders Placed This Encounter   Procedures    INFLUENZA VACCINE, TRI, PRESERV FREE, 0.5 ML       Nutritional Goals  Calorie-controlled diet:  2000     ON phentermine full tab.  Monitor BP.  Schedule physical for 2/2025.  Given Hep B #2 today.  Advised COVID.  Given flu shot.  Labs due.  US liver ordered in the system.  Will do [facility] and [facility] -- pt. Aware he did the last one in 2021.  Will call  For his left knee pain from [facility].  Discussed losing 2-4 lbs in 1 month.  Discussed to focus on diet and exercise.  Will monitor his wrist discomfort.  Deferred PT for now due to time.  Meds refilled.        Behavior Modifications Reviewed and Discussed  Drink 64 oz of water per day, Utlize portion control strategies to reduce calorie intake, Identify triggers for eating and manage cues, and Eat slowly and take 20 to 30 minutes to complete each meal    Exercise Goals Reviewed and Discussed    Walk for  30 Minutes    Follow up with ortho or left knee discomfort.  Dr. Luis Norwood  [facility] [place]      Meds & Refills for this Visit:  Requested Prescriptions     Signed Prescriptions Disp Refills    Phentermine HCl 37.5 MG Oral Tab 30 tablet 1     Sig: Take 1 tablet (37.5 mg total) by mouth every morning before breakfast.    SYMBICORT 160-4.5 MCG/ACT Inhalation Aerosol 3 each 1     Sig: Inhale 2 puffs into the lungs 2 (two) times daily.       Imaging & Consults:  INFLUENZA VACCINE, TRI, PRESERV FREE, 0.5 ML              No follow-ups on file.

## 2024-11-18 NOTE — PROGRESS NOTES
Food Journal  Reviewed and Discussed:          Patient has a Food Journal?:  no  Patient is reading nutrition labels?  yes  Average Caloric Intake:          7552-7283  Average CHO Intake:  unsure  Is patient exercising?  Walking; between 1-2 miles x 2 a week       Eating Habits  Patient states the following:  Eats 3 meal(s) per day  Length of time it takes to consume a meal:  20 mins  # of snacks per day: 0-2         Type of snacks:  nuts, dried fruit, banana   Amount of soda consumption per day:  0  Amount of water (in ounces) per day:  60 oz  Drinking between meals only:  no  Toughest challenge: cravings for certain foods

## 2024-12-18 ENCOUNTER — OFFICE VISIT (OUTPATIENT)
Dept: FAMILY MEDICINE CLINIC | Facility: CLINIC | Age: 54
End: 2024-12-18
Payer: COMMERCIAL

## 2024-12-18 VITALS
HEART RATE: 82 BPM | SYSTOLIC BLOOD PRESSURE: 142 MMHG | BODY MASS INDEX: 31.84 KG/M2 | DIASTOLIC BLOOD PRESSURE: 84 MMHG | RESPIRATION RATE: 18 BRPM | WEIGHT: 240.25 LBS | OXYGEN SATURATION: 98 % | HEIGHT: 73 IN

## 2024-12-18 DIAGNOSIS — Z23 NEED FOR VACCINATION: ICD-10-CM

## 2024-12-18 DIAGNOSIS — Z71.85 VACCINE COUNSELING: ICD-10-CM

## 2024-12-18 DIAGNOSIS — Z71.82 EXERCISE COUNSELING: ICD-10-CM

## 2024-12-18 DIAGNOSIS — Z79.899 MEDICATION MANAGEMENT: ICD-10-CM

## 2024-12-18 DIAGNOSIS — M25.531 BILATERAL WRIST PAIN: ICD-10-CM

## 2024-12-18 DIAGNOSIS — E78.2 MIXED HYPERLIPIDEMIA: ICD-10-CM

## 2024-12-18 DIAGNOSIS — M25.562 LEFT KNEE PAIN, UNSPECIFIED CHRONICITY: ICD-10-CM

## 2024-12-18 DIAGNOSIS — R79.89 ELEVATED LIVER FUNCTION TESTS: ICD-10-CM

## 2024-12-18 DIAGNOSIS — E66.811 CLASS 1 OBESITY DUE TO EXCESS CALORIES WITHOUT SERIOUS COMORBIDITY WITH BODY MASS INDEX (BMI) OF 31.0 TO 31.9 IN ADULT: Primary | ICD-10-CM

## 2024-12-18 DIAGNOSIS — E55.9 VITAMIN D DEFICIENCY: ICD-10-CM

## 2024-12-18 DIAGNOSIS — Z71.3 DIETARY COUNSELING: ICD-10-CM

## 2024-12-18 DIAGNOSIS — E66.09 CLASS 1 OBESITY DUE TO EXCESS CALORIES WITHOUT SERIOUS COMORBIDITY WITH BODY MASS INDEX (BMI) OF 31.0 TO 31.9 IN ADULT: Primary | ICD-10-CM

## 2024-12-18 DIAGNOSIS — K75.81 NASH (NONALCOHOLIC STEATOHEPATITIS): ICD-10-CM

## 2024-12-18 DIAGNOSIS — M25.532 BILATERAL WRIST PAIN: ICD-10-CM

## 2024-12-18 LAB
ALBUMIN SERPL-MCNC: 4.6 G/DL (ref 3.2–4.8)
ALBUMIN/GLOB SERPL: 1.5 {RATIO} (ref 1–2)
ALP LIVER SERPL-CCNC: 94 U/L
ALT SERPL-CCNC: 115 U/L
ANION GAP SERPL CALC-SCNC: 7 MMOL/L (ref 0–18)
AST SERPL-CCNC: 70 U/L (ref ?–34)
BILIRUB SERPL-MCNC: 0.6 MG/DL (ref 0.3–1.2)
BUN BLD-MCNC: 11 MG/DL (ref 9–23)
CALCIUM BLD-MCNC: 10.1 MG/DL (ref 8.7–10.4)
CHLORIDE SERPL-SCNC: 105 MMOL/L (ref 98–112)
CHOLEST SERPL-MCNC: 215 MG/DL (ref ?–200)
CO2 SERPL-SCNC: 26 MMOL/L (ref 21–32)
CREAT BLD-MCNC: 1.14 MG/DL
EGFRCR SERPLBLD CKD-EPI 2021: 76 ML/MIN/1.73M2 (ref 60–?)
FASTING PATIENT LIPID ANSWER: NO
FASTING STATUS PATIENT QL REPORTED: NO
GLOBULIN PLAS-MCNC: 3.1 G/DL (ref 2–3.5)
GLUCOSE BLD-MCNC: 111 MG/DL (ref 70–99)
HDLC SERPL-MCNC: 56 MG/DL (ref 40–59)
LDLC SERPL CALC-MCNC: 139 MG/DL (ref ?–100)
NONHDLC SERPL-MCNC: 159 MG/DL (ref ?–130)
OSMOLALITY SERPL CALC.SUM OF ELEC: 286 MOSM/KG (ref 275–295)
POTASSIUM SERPL-SCNC: 5.1 MMOL/L (ref 3.5–5.1)
PROT SERPL-MCNC: 7.7 G/DL (ref 5.7–8.2)
RUBV IGG SER QL: POSITIVE
RUBV IGG SER-ACNC: 240 IU/ML (ref 10–?)
SODIUM SERPL-SCNC: 138 MMOL/L (ref 136–145)
TRIGL SERPL-MCNC: 110 MG/DL (ref 30–149)
VIT D+METAB SERPL-MCNC: 31.2 NG/ML (ref 30–100)
VLDLC SERPL CALC-MCNC: 20 MG/DL (ref 0–30)

## 2024-12-18 PROCEDURE — 82306 VITAMIN D 25 HYDROXY: CPT | Performed by: FAMILY MEDICINE

## 2024-12-18 PROCEDURE — 90632 HEPA VACCINE ADULT IM: CPT | Performed by: FAMILY MEDICINE

## 2024-12-18 PROCEDURE — 90471 IMMUNIZATION ADMIN: CPT | Performed by: FAMILY MEDICINE

## 2024-12-18 PROCEDURE — 90746 HEPB VACCINE 3 DOSE ADULT IM: CPT | Performed by: FAMILY MEDICINE

## 2024-12-18 PROCEDURE — 90472 IMMUNIZATION ADMIN EACH ADD: CPT | Performed by: FAMILY MEDICINE

## 2024-12-18 PROCEDURE — 80053 COMPREHEN METABOLIC PANEL: CPT | Performed by: FAMILY MEDICINE

## 2024-12-18 PROCEDURE — 99214 OFFICE O/P EST MOD 30 MIN: CPT | Performed by: FAMILY MEDICINE

## 2024-12-18 PROCEDURE — 86762 RUBELLA ANTIBODY: CPT | Performed by: FAMILY MEDICINE

## 2024-12-18 PROCEDURE — 80061 LIPID PANEL: CPT | Performed by: FAMILY MEDICINE

## 2024-12-18 RX ORDER — PHENTERMINE HYDROCHLORIDE 37.5 MG/1
37.5 TABLET ORAL
Qty: 30 TABLET | Refills: 1 | Status: SHIPPED | OUTPATIENT
Start: 2024-12-18

## 2024-12-18 RX ORDER — PHENTERMINE HYDROCHLORIDE 37.5 MG/1
37.5 TABLET ORAL
Qty: 30 TABLET | Refills: 1 | Status: CANCELLED | OUTPATIENT
Start: 2024-12-18

## 2024-12-18 NOTE — PROGRESS NOTES
Food Journal  Reviewed and Discussed:          Patient has a Food Journal?:  yes, not consistent.   Patient is reading nutrition labels?  yes  Average Caloric Intake:          2057-4968  Average CHO Intake:  unsure  Is patient exercising?  Walking; between 1-2 miles x 2 a week       Eating Habits  Patient states the following:  Eats 3 meal(s) per day  Length of time it takes to consume a meal:  20 mins  # of snacks per day: 0-2         Type of snacks:  nuts, dried fruit, banana   Amount of soda consumption per day:  0  Amount of water (in ounces) per day:  60 oz  Drinking between meals only:  no  Toughest challenge: traveling and stress

## 2024-12-18 NOTE — PROGRESS NOTES
Dear Dariel,    Your cholesterol is improving but elevated -- focus on diet and exercise.  Your liver enzymes are elevated but stable.  Please do the ultrasound for your liver.  You are immune to measles, mumps, and rubella.  The rest of your blood work is stable.    Sincerely,  Dr. Covarrubias

## 2024-12-18 NOTE — PROGRESS NOTES
[facility], [address] [address]  Dept: [phone]       Patient:  Dariel Bray  :      1970  MRN:      [id number]    Chief Complaint:    Chief Complaint   Patient presents with    Weight Check       SUBJECTIVE     History of Present Illness:  Dariel is being seen today for a follow-up for weight.              Past Medical History:   Past Medical History:    Abdominal hernia    AR (allergic rhinitis)    Bloating    Calculus of kidney    Chest pain    Esophageal ring    GERD (gastroesophageal reflux disease)    Heartburn    HIGH CHOLESTEROL    not on medication    Hyperlipidemia    Kidney stone    ALVAREZ (nonalcoholic steatohepatitis)    Plantar fasciitis    Problems with swallowing    Sleep apnea    Unspecified sleep apnea    AHI 23.7 SaO2 87% CPAP 8 [facility]          OBJECTIVE     Vitals: /84 (BP Location: Right arm, Patient Position: Sitting, Cuff Size: large)   Pulse 82   Resp 18   Ht 6' 1\" (1.854 m)   Wt 240 lb 4 oz (109 kg)   SpO2 98%   BMI 31.70 kg/m²     Initial weight loss: -1   Total weight loss: 11   Start weight: 251    Wt Readings from Last 3 Encounters:   24 240 lb 4 oz (109 kg)   24 239 lb (108.4 kg)   24 236 lb 4 oz (107.2 kg)       Patient Medications:    Current Outpatient Medications   Medication Sig Dispense Refill    Phentermine HCl 37.5 MG Oral Tab Take 1 tablet (37.5 mg total) by mouth every morning before breakfast. 30 tablet 1    SYMBICORT 160-4.5 MCG/ACT Inhalation Aerosol Inhale 2 puffs into the lungs 2 (two) times daily. 3 each 1    AZELASTINE 0.1 % Nasal Solution USE 2 SPRAYS IN EACH NOSTRIL TWICE DAILY 90 mL 0    NUCALA 100 MG/ML Subcutaneous Solution Auto-injector       Fluticasone Propionate 50 MCG/ACT Nasal Suspension 2 sprays by Each Nare route daily. 3 Bottle 3     Allergies:  Pcn [bicillin l-a]     Social History:    Social History      Socioeconomic History    Marital status:      Spouse name: Not on file    Number of children: 3    Years of education: Not on file    Highest education level: Not on file   Occupational History    Occupation: sales     Comment: [employer] Medical bills   Tobacco Use    Smoking status: Never    Smokeless tobacco: Never   Vaping Use    Vaping status: Never Used   Substance and Sexual Activity    Alcohol use: Yes     Alcohol/week: 3.0 standard drinks of alcohol     Types: 3 Glasses of wine per week    Drug use: No    Sexual activity: Not on file   Other Topics Concern    Not on file   Social History Narrative    Not on file     Social Drivers of Health     Financial Resource Strain: Not on file   Food Insecurity: Not on file   Transportation Needs: Not on file   Physical Activity: Not on file   Stress: Not on file   Social Connections: Not on file   Housing Stability: Not on file     Surgical History:    Past Surgical History:   Procedure Laterality Date    Lithotripsy      Other surgical history      foot    Other surgical history      lasik    Other surgical history      acl    Other surgical history      wrist,right    Other surgical history  10/22/13    Cystoscopy - Dr. Devi     Up gi endoscopy,pete mason guide  9/24/13= Esophageal ring    No Jaimes's     Family History:    Family History   Problem Relation Age of Onset    Diabetes Other     Other (Other) Father         copd    Diabetes Mother      Food Journal  Reviewed and Discussed:          Patient has a Food Journal?:  yes, not consistent.   Patient is reading nutrition labels?  yes  Average Caloric Intake:          7676-7284  Average CHO Intake:  unsure  Is patient exercising?  Walking; between 1-2 miles x 2 a week       Eating Habits  Patient states the following:  Eats 3 meal(s) per day  Length of time it takes to consume a meal:  20 mins  # of snacks per day: 0-2         Type of snacks:  nuts, dried fruit, banana   Amount of soda consumption per  day:  0  Amount of water (in ounces) per day:  60 oz  Drinking between meals only:  no  Toughest challenge: traveling and stress      ROS:    Pertinent items are noted in HPI.  A comprehensive review of systems was negative.  All other systems were reviewed and are negative    Physical Exam:   /84 (BP Location: Right arm, Patient Position: Sitting, Cuff Size: large)   Pulse 82   Resp 18   Ht 6' 1\" (1.854 m)   Wt 240 lb 4 oz (109 kg)   SpO2 98%   BMI 31.70 kg/m²   General appearance: alert, appears stated age, and cooperative  Head: Normocephalic, without obvious abnormality, atraumatic  Neck: no adenopathy, no carotid bruit, no JVD, supple, symmetrical, trachea midline, and thyroid mildy enlarged, symmetric, no tenderness/mass/nodules  Lungs: clear to auscultation bilaterally  Heart: S1, S2 normal, no murmur, click, rub or gallop, regular rate and rhythm  Abdomen: soft, non-tender; bowel sounds normal; no masses,  no organomegaly  Extremities: extremities normal, atraumatic, no cyanosis or edema  Skin: Skin color, texture, turgor normal. No rashes or lesions    ASSESSMENT/PLAN     Encounter Diagnoses   Name Primary?    Class 1 obesity due to excess calories without serious comorbidity with body mass index (BMI) of 31.0 to 31.9 in adult Yes    Mixed hyperlipidemia     ALVAREZ (nonalcoholic steatohepatitis)     Dietary counseling     Exercise counseling     Bilateral wrist pain     Left knee pain, unspecified chronicity     Medication management     Vaccine counseling     Need for vaccination            Orders Placed This Encounter   Procedures    Hep B, Adult [99304]    Hep A, Adult [39517]       Nutritional Goals  Calorie-controlled diet:  2000     ON phentermine full tab.  Monitor BP.  Cut out caffeine.  Schedule physical for 2/2025.  Given Hep A#2 and B #3 today.  Advised COVID.    Labs due.  US liver ordered in the system.  Will do [facility] and PVS -- pt. Aware he did the last one in 2021.  Will call  For  his left knee pain from [facility].  Discussed losing 2-4 lbs in 1 month.  Discussed to focus on diet and exercise.    Meds refilled.        Behavior Modifications Reviewed and Discussed  Drink 64 oz of water per day, Utlize portion control strategies to reduce calorie intake, Identify triggers for eating and manage cues, and Eat slowly and take 20 to 30 minutes to complete each meal    Exercise Goals Reviewed and Discussed    Walk for  30 Minutes    Follow up with ortho or left knee discomfort.  Dr. Luis Norwood  [facility]      Meds & Refills for this Visit:  Requested Prescriptions     Signed Prescriptions Disp Refills    Phentermine HCl 37.5 MG Oral Tab 30 tablet 1     Sig: Take 1 tablet (37.5 mg total) by mouth every morning before breakfast.       Imaging & Consults:  HEPATITIS B VACCINE, OVER 20  HEPATITIS A VACCINE              Return in about 2 months (around 2/18/2025) for OM -15, 3/2025 -- physical, and monthly OM visits.

## 2025-01-06 ENCOUNTER — OFFICE VISIT (OUTPATIENT)
Dept: FAMILY MEDICINE CLINIC | Facility: CLINIC | Age: 55
End: 2025-01-06
Payer: COMMERCIAL

## 2025-01-06 VITALS
BODY MASS INDEX: 32.2 KG/M2 | RESPIRATION RATE: 18 BRPM | WEIGHT: 243 LBS | OXYGEN SATURATION: 97 % | DIASTOLIC BLOOD PRESSURE: 74 MMHG | SYSTOLIC BLOOD PRESSURE: 132 MMHG | HEIGHT: 73 IN | HEART RATE: 76 BPM

## 2025-01-06 DIAGNOSIS — F43.0 STRESS REACTION: ICD-10-CM

## 2025-01-06 DIAGNOSIS — Z71.82 EXERCISE COUNSELING: ICD-10-CM

## 2025-01-06 DIAGNOSIS — J32.9 CHRONIC CONGESTION OF PARANASAL SINUS: Primary | ICD-10-CM

## 2025-01-06 DIAGNOSIS — B07.9 VIRAL WARTS, UNSPECIFIED TYPE: ICD-10-CM

## 2025-01-06 DIAGNOSIS — E66.811 CLASS 1 OBESITY DUE TO EXCESS CALORIES WITHOUT SERIOUS COMORBIDITY WITH BODY MASS INDEX (BMI) OF 32.0 TO 32.9 IN ADULT: ICD-10-CM

## 2025-01-06 DIAGNOSIS — E66.09 CLASS 1 OBESITY DUE TO EXCESS CALORIES WITHOUT SERIOUS COMORBIDITY WITH BODY MASS INDEX (BMI) OF 32.0 TO 32.9 IN ADULT: ICD-10-CM

## 2025-01-06 DIAGNOSIS — Z71.3 DIETARY COUNSELING: ICD-10-CM

## 2025-01-06 PROCEDURE — 99214 OFFICE O/P EST MOD 30 MIN: CPT | Performed by: FAMILY MEDICINE

## 2025-01-06 NOTE — PROGRESS NOTES
[facility], [address] [address]  Dept: [phone]       Patient:  Dariel Bray  :      1970  MRN:      [id number]    Chief Complaint:    Chief Complaint   Patient presents with    Weight Check       SUBJECTIVE     History of Present Illness:  Dariel is being seen today for a follow-up for weight.  Patient has been under a lot of stress with his mother who was at [facility] and he is the only child [place].  He also has stress with the upcoming consultation.  Patient also has been traveling for work.  He is aware he needs to focus on diet and exercise.  He like to get a bike that is reasonably priced.  Patient states he may feel down at times but he is able to get himself out of  Patient complains of chronic sinus issues.  He states he was told in the past that he would need sinus surgery but had a 50% chance of working.  He is wondering if he needs to return to them.  He does use the Ayr gel that I suggested for him to keep his sinuses from bleeding.            Past Medical History:   Past Medical History:    Abdominal hernia    AR (allergic rhinitis)    Bloating    Calculus of kidney    Chest pain    Esophageal ring    GERD (gastroesophageal reflux disease)    Heartburn    HIGH CHOLESTEROL    not on medication    Hyperlipidemia    Kidney stone    ALVAREZ (nonalcoholic steatohepatitis)    Plantar fasciitis    Problems with swallowing    Sleep apnea    Unspecified sleep apnea    AHI 23.7 SaO2 87% CPAP 8 [facility]          OBJECTIVE     Vitals: /74 (BP Location: Right arm, Patient Position: Sitting, Cuff Size: large)   Pulse 76   Resp 18   Ht 6' 1\" (1.854 m)   Wt 243 lb (110.2 kg)   SpO2 97%   BMI 32.06 kg/m²     Initial weight loss: -2   Total weight loss: 9   Start weight: 251    Wt Readings from Last 3 Encounters:   25 243 lb (110.2 kg)   24 240 lb 4 oz (109 kg)   24 239 lb  (108.4 kg)       Patient Medications:    Current Outpatient Medications   Medication Sig Dispense Refill    Phentermine HCl 37.5 MG Oral Tab Take 1 tablet (37.5 mg total) by mouth every morning before breakfast. 30 tablet 1    SYMBICORT 160-4.5 MCG/ACT Inhalation Aerosol Inhale 2 puffs into the lungs 2 (two) times daily. 3 each 1    AZELASTINE 0.1 % Nasal Solution USE 2 SPRAYS IN EACH NOSTRIL TWICE DAILY 90 mL 0    NUCALA 100 MG/ML Subcutaneous Solution Auto-injector       Fluticasone Propionate 50 MCG/ACT Nasal Suspension 2 sprays by Each Nare route daily. 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   Tobacco Use    Smoking status: Never    Smokeless tobacco: Never   Vaping Use    Vaping status: Never Used   Substance and Sexual Activity    Alcohol use: Yes     Alcohol/week: 3.0 standard drinks of alcohol     Types: 3 Glasses of wine per week    Drug use: No    Sexual activity: Not on file   Other Topics Concern    Not on file   Social History Narrative    Not on file     Social Drivers of Health     Financial Resource Strain: Not on file   Food Insecurity: Not on file   Transportation Needs: Not on file   Physical Activity: Not on file   Stress: Not on file   Social Connections: Not on file   Housing Stability: Not on file     Surgical History:    Past Surgical History:   Procedure Laterality Date    Lithotripsy      Other surgical history      foot    Other surgical history      lasik    Other surgical history      acl    Other surgical history      wrist,right    Other surgical history  10/22/13    Cystoscopy - Dr. Devi     Up gi endoscopy,pete w guide  9/24/13= Esophageal ring    No Jaimes's     Family  History:    Family History   Problem Relation Age of Onset    Diabetes Other     Other (Other) Father         copd    Diabetes Mother      Food Journal  Reviewed and Discussed:          Patient has a Food Journal?:  yes, not consistent.   Patient is reading nutrition labels?  yes  Average Caloric Intake:          7744-9221  Average CHO Intake:  unsure  Is patient exercising?  Walking; between 1-2 miles x 2 a week       Eating Habits  Patient states the following:  Eats 3 meal(s) per day  Length of time it takes to consume a meal:  20 mins  # of snacks per day: 0-2         Type of snacks:  nuts, dried fruit, banana   Amount of soda consumption per day:  0  Amount of water (in ounces) per day:  60 oz  Drinking between meals only:  no  Toughest challenge: traveling and stress      ROS:    Pertinent items are noted in HPI.  A comprehensive review of systems was negative.  All other systems were reviewed and are negative    Physical Exam:   /74 (BP Location: Right arm, Patient Position: Sitting, Cuff Size: large)   Pulse 76   Resp 18   Ht 6' 1\" (1.854 m)   Wt 243 lb (110.2 kg)   SpO2 97%   BMI 32.06 kg/m²   General appearance: alert, appears stated age, and cooperative  Head: Normocephalic, without obvious abnormality, atraumatic; swollen turbinates with congestion and dryness-left worse than right  Neck: no adenopathy, no carotid bruit, no JVD, supple, symmetrical, trachea midline, and thyroid mildy enlarged, symmetric, no tenderness/mass/nodules  Lungs: clear to auscultation bilaterally  Heart: S1, S2 normal, no murmur, click, rub or gallop, regular rate and rhythm  Abdomen: soft, non-tender; bowel sounds normal; no masses,  no organomegaly  Extremities: extremities normal, atraumatic, no cyanosis or edema  Skin: Skin color, texture, turgor normal. No rashes or lesions; wart on right hand and right parietal    ASSESSMENT/PLAN     Encounter Diagnoses   Name Primary?    Class 1 obesity due to excess calories  without serious comorbidity with body mass index (BMI) of 32.0 to 32.9 in adult     Chronic congestion of paranasal sinus Yes    Exercise counseling     Dietary counseling     Viral warts, unspecified type     Stress reaction              No orders of the defined types were placed in this encounter.      Nutritional Goals  Calorie-controlled diet:  2000     ON phentermine full tab.  Monitor BP.  Cut out caffeine.  Schedule physical for 2/2025.  Advised Dr. Jo for derm.  Advised COVID.  Monitor stress   Advised [employer] for work out equipment.      Labs due.  US liver ordered in the system.  Will do [facility] and PVS -- pt. Aware he did the last one in 2021.  Will call  For his left knee pain from [facility].  Discussed losing 2-4 lbs in 1 month.  Discussed to focus on diet and exercise.        Behavior Modifications Reviewed and Discussed  Drink 64 oz of water per day, Utlize portion control strategies to reduce calorie intake, Identify triggers for eating and manage cues, and Eat slowly and take 20 to 30 minutes to complete each meal    Exercise Goals Reviewed and Discussed    Walk for  30 Minutes      Meds & Refills for this Visit:  Requested Prescriptions      No prescriptions requested or ordered in this encounter       Imaging & Consults:  ENT - INTERNAL              No follow-ups on file.

## 2025-01-06 NOTE — PROGRESS NOTES
Food Journal  Reviewed and Discussed:          Patient has a Food Journal?:  yes  Patient is reading nutrition labels?  yes  Average Caloric Intake:          5160-0193  Average CHO Intake:  unsure  Is patient exercising?  Walking; between 1-2 miles x 2 a week       Eating Habits  Patient states the following:  Eats 2-3 meal(s) per day  Length of time it takes to consume a meal:  20 mins  # of snacks per day: 0-2         Type of snacks:  nuts, dried fruit, banana   Amount of soda consumption per day:  0  Amount of water (in ounces) per day:  60 oz  Drinking between meals only:  no  Toughest challenge: Stress

## 2025-02-05 ENCOUNTER — TELEPHONE (OUTPATIENT)
Dept: FAMILY MEDICINE CLINIC | Facility: CLINIC | Age: 55
End: 2025-02-05

## 2025-02-05 NOTE — TELEPHONE ENCOUNTER
I called Patient Jay to reschedule  03.18.2025 appointment, and he stated that will be going out of town,and will call us back to schedule.

## 2025-02-15 DIAGNOSIS — E66.09 CLASS 1 OBESITY DUE TO EXCESS CALORIES WITHOUT SERIOUS COMORBIDITY WITH BODY MASS INDEX (BMI) OF 31.0 TO 31.9 IN ADULT: ICD-10-CM

## 2025-02-15 DIAGNOSIS — Z71.82 EXERCISE COUNSELING: ICD-10-CM

## 2025-02-15 DIAGNOSIS — Z71.3 DIETARY COUNSELING: ICD-10-CM

## 2025-02-15 DIAGNOSIS — E66.811 CLASS 1 OBESITY DUE TO EXCESS CALORIES WITHOUT SERIOUS COMORBIDITY WITH BODY MASS INDEX (BMI) OF 31.0 TO 31.9 IN ADULT: ICD-10-CM

## 2025-02-17 RX ORDER — PHENTERMINE HYDROCHLORIDE 37.5 MG/1
37.5 TABLET ORAL
Qty: 30 TABLET | Refills: 1 | Status: SHIPPED | OUTPATIENT
Start: 2025-02-17

## 2025-02-17 NOTE — TELEPHONE ENCOUNTER
Last office visit: 1/6/25   Protocol: fail  Requested medication(s) are due for refill today: yes  Requested medication(s) are on the active medication list same strength, form, dose/ sig: yes  Requested medication(s) are managed by provider: yes  Patient has already received a courtsey refill: no    NOV: 3/12/25    Asked to Return: 2/3/25

## 2025-03-12 ENCOUNTER — OFFICE VISIT (OUTPATIENT)
Dept: FAMILY MEDICINE CLINIC | Facility: CLINIC | Age: 55
End: 2025-03-12
Payer: COMMERCIAL

## 2025-03-12 VITALS
SYSTOLIC BLOOD PRESSURE: 128 MMHG | HEART RATE: 75 BPM | OXYGEN SATURATION: 97 % | RESPIRATION RATE: 18 BRPM | BODY MASS INDEX: 30.37 KG/M2 | WEIGHT: 229.13 LBS | HEIGHT: 73 IN | DIASTOLIC BLOOD PRESSURE: 74 MMHG

## 2025-03-12 DIAGNOSIS — J82.83 EOSINOPHILIC ASTHMA (HCC): ICD-10-CM

## 2025-03-12 DIAGNOSIS — Z12.5 SCREENING FOR MALIGNANT NEOPLASM OF PROSTATE: ICD-10-CM

## 2025-03-12 DIAGNOSIS — E55.9 VITAMIN D DEFICIENCY: ICD-10-CM

## 2025-03-12 DIAGNOSIS — E66.09 CLASS 1 OBESITY DUE TO EXCESS CALORIES WITHOUT SERIOUS COMORBIDITY WITH BODY MASS INDEX (BMI) OF 30.0 TO 30.9 IN ADULT: ICD-10-CM

## 2025-03-12 DIAGNOSIS — Z71.3 DIETARY COUNSELING: ICD-10-CM

## 2025-03-12 DIAGNOSIS — Z00.00 ROUTINE GENERAL MEDICAL EXAMINATION AT A HEALTH CARE FACILITY: Primary | ICD-10-CM

## 2025-03-12 DIAGNOSIS — Z00.00 LABORATORY EXAMINATION ORDERED AS PART OF A ROUTINE GENERAL MEDICAL EXAMINATION: ICD-10-CM

## 2025-03-12 DIAGNOSIS — E66.811 CLASS 1 OBESITY DUE TO EXCESS CALORIES WITHOUT SERIOUS COMORBIDITY WITH BODY MASS INDEX (BMI) OF 30.0 TO 30.9 IN ADULT: ICD-10-CM

## 2025-03-12 DIAGNOSIS — Z13.89 SCREENING FOR GENITOURINARY CONDITION: ICD-10-CM

## 2025-03-12 DIAGNOSIS — Z71.82 EXERCISE COUNSELING: ICD-10-CM

## 2025-03-12 LAB
ALBUMIN SERPL-MCNC: 5.1 G/DL (ref 3.2–4.8)
ALBUMIN/GLOB SERPL: 1.8 {RATIO} (ref 1–2)
ALP LIVER SERPL-CCNC: 94 U/L
ALT SERPL-CCNC: 84 U/L
ANION GAP SERPL CALC-SCNC: 11 MMOL/L (ref 0–18)
AST SERPL-CCNC: 58 U/L (ref ?–34)
BASOPHILS # BLD AUTO: 0.03 X10(3) UL (ref 0–0.2)
BASOPHILS NFR BLD AUTO: 0.4 %
BILIRUB SERPL-MCNC: 0.6 MG/DL (ref 0.3–1.2)
BILIRUB UR QL STRIP.AUTO: NEGATIVE
BUN BLD-MCNC: 14 MG/DL (ref 9–23)
CALCIUM BLD-MCNC: 10 MG/DL (ref 8.7–10.6)
CHLORIDE SERPL-SCNC: 101 MMOL/L (ref 98–112)
CHOLEST SERPL-MCNC: 186 MG/DL (ref ?–200)
CLARITY UR REFRACT.AUTO: CLEAR
CO2 SERPL-SCNC: 28 MMOL/L (ref 21–32)
COLOR UR AUTO: YELLOW
CREAT BLD-MCNC: 1.16 MG/DL
EGFRCR SERPLBLD CKD-EPI 2021: 74 ML/MIN/1.73M2 (ref 60–?)
EOSINOPHIL # BLD AUTO: 0.27 X10(3) UL (ref 0–0.7)
EOSINOPHIL NFR BLD AUTO: 3.8 %
ERYTHROCYTE [DISTWIDTH] IN BLOOD BY AUTOMATED COUNT: 13.8 %
EST. AVERAGE GLUCOSE BLD GHB EST-MCNC: 114 MG/DL (ref 68–126)
FASTING PATIENT LIPID ANSWER: YES
FASTING STATUS PATIENT QL REPORTED: YES
GLOBULIN PLAS-MCNC: 2.9 G/DL (ref 2–3.5)
GLUCOSE BLD-MCNC: 98 MG/DL (ref 70–99)
GLUCOSE UR STRIP.AUTO-MCNC: NORMAL MG/DL
HBA1C MFR BLD: 5.6 % (ref ?–5.7)
HCT VFR BLD AUTO: 46.8 %
HDLC SERPL-MCNC: 51 MG/DL (ref 40–59)
HGB BLD-MCNC: 15.6 G/DL
IMM GRANULOCYTES # BLD AUTO: 0.02 X10(3) UL (ref 0–1)
IMM GRANULOCYTES NFR BLD: 0.3 %
KETONES UR STRIP.AUTO-MCNC: NEGATIVE MG/DL
LDLC SERPL CALC-MCNC: 121 MG/DL (ref ?–100)
LEUKOCYTE ESTERASE UR QL STRIP.AUTO: NEGATIVE
LYMPHOCYTES # BLD AUTO: 1.76 X10(3) UL (ref 1–4)
LYMPHOCYTES NFR BLD AUTO: 24.7 %
MCH RBC QN AUTO: 28.7 PG (ref 26–34)
MCHC RBC AUTO-ENTMCNC: 33.3 G/DL (ref 31–37)
MCV RBC AUTO: 86 FL
MONOCYTES # BLD AUTO: 0.49 X10(3) UL (ref 0.1–1)
MONOCYTES NFR BLD AUTO: 6.9 %
NEUTROPHILS # BLD AUTO: 4.55 X10 (3) UL (ref 1.5–7.7)
NEUTROPHILS # BLD AUTO: 4.55 X10(3) UL (ref 1.5–7.7)
NEUTROPHILS NFR BLD AUTO: 63.9 %
NITRITE UR QL STRIP.AUTO: NEGATIVE
NONHDLC SERPL-MCNC: 135 MG/DL (ref ?–130)
OSMOLALITY SERPL CALC.SUM OF ELEC: 290 MOSM/KG (ref 275–295)
PH UR STRIP.AUTO: 5.5 [PH] (ref 5–8)
PLATELET # BLD AUTO: 259 10(3)UL (ref 150–450)
POTASSIUM SERPL-SCNC: 4.5 MMOL/L (ref 3.5–5.1)
PROT SERPL-MCNC: 8 G/DL (ref 5.7–8.2)
PROT UR STRIP.AUTO-MCNC: NEGATIVE MG/DL
RBC # BLD AUTO: 5.44 X10(6)UL
RBC UR QL AUTO: NEGATIVE
SODIUM SERPL-SCNC: 140 MMOL/L (ref 136–145)
SP GR UR STRIP.AUTO: 1.02 (ref 1–1.03)
TRIGL SERPL-MCNC: 77 MG/DL (ref 30–149)
TSI SER-ACNC: 2.49 UIU/ML (ref 0.55–4.78)
UROBILINOGEN UR STRIP.AUTO-MCNC: NORMAL MG/DL
VIT D+METAB SERPL-MCNC: 64.1 NG/ML (ref 30–100)
VLDLC SERPL CALC-MCNC: 13 MG/DL (ref 0–30)
WBC # BLD AUTO: 7.1 X10(3) UL (ref 4–11)

## 2025-03-12 PROCEDURE — 84154 ASSAY OF PSA FREE: CPT | Performed by: FAMILY MEDICINE

## 2025-03-12 PROCEDURE — 84443 ASSAY THYROID STIM HORMONE: CPT | Performed by: FAMILY MEDICINE

## 2025-03-12 PROCEDURE — 80061 LIPID PANEL: CPT | Performed by: FAMILY MEDICINE

## 2025-03-12 PROCEDURE — 85025 COMPLETE CBC W/AUTO DIFF WBC: CPT | Performed by: FAMILY MEDICINE

## 2025-03-12 PROCEDURE — 80053 COMPREHEN METABOLIC PANEL: CPT | Performed by: FAMILY MEDICINE

## 2025-03-12 PROCEDURE — 83036 HEMOGLOBIN GLYCOSYLATED A1C: CPT | Performed by: FAMILY MEDICINE

## 2025-03-12 PROCEDURE — 99214 OFFICE O/P EST MOD 30 MIN: CPT | Performed by: FAMILY MEDICINE

## 2025-03-12 PROCEDURE — 82306 VITAMIN D 25 HYDROXY: CPT | Performed by: FAMILY MEDICINE

## 2025-03-12 PROCEDURE — 81003 URINALYSIS AUTO W/O SCOPE: CPT | Performed by: FAMILY MEDICINE

## 2025-03-12 PROCEDURE — 84153 ASSAY OF PSA TOTAL: CPT | Performed by: FAMILY MEDICINE

## 2025-03-12 RX ORDER — AMOXICILLIN 500 MG
CAPSULE ORAL
COMMUNITY
Start: 2025-01-01

## 2025-03-12 RX ORDER — PHENTERMINE HYDROCHLORIDE 37.5 MG/1
37.5 TABLET ORAL
Qty: 30 TABLET | Refills: 1 | Status: SHIPPED | OUTPATIENT
Start: 2025-03-12

## 2025-03-12 RX ORDER — LORATADINE 10 MG/1
CAPSULE, LIQUID FILLED ORAL
COMMUNITY
Start: 2025-01-01

## 2025-03-12 RX ORDER — EAR PLUGS
EACH OTIC (EAR)
COMMUNITY
Start: 2025-01-01

## 2025-03-12 RX ORDER — BUDESONIDE AND FORMOTEROL FUMARATE DIHYDRATE 160; 4.5 UG/1; UG/1
2 AEROSOL RESPIRATORY (INHALATION) 2 TIMES DAILY
Qty: 3 EACH | Refills: 1 | Status: SHIPPED | OUTPATIENT
Start: 2025-03-12

## 2025-03-12 RX ORDER — ASPIRIN 81 MG/1
TABLET ORAL
COMMUNITY
Start: 2025-01-01

## 2025-03-12 NOTE — H&P
Dariel Bray is a 54 year old male who presents for a complete physical exam.   HPI:   Pt complains of notes blood from his nares.  Using saline spray and ayr.  From his cpap.          Wt Readings from Last 6 Encounters:   03/12/25 229 lb 2 oz (103.9 kg)   01/06/25 243 lb (110.2 kg)   12/18/24 240 lb 4 oz (109 kg)   11/18/24 239 lb (108.4 kg)   09/17/24 236 lb 4 oz (107.2 kg)   07/15/24 237 lb 2 oz (107.6 kg)     Body mass index is 30.23 kg/m².     Results for orders placed or performed in visit on 12/18/24   RUBELLA, IGG [802] [Q]    Collection Time: 12/18/24  9:00 AM   Result Value Ref Range    Rubella IgG Positive Positive    Rubella IgG Quantitative 240 >=10 IU/mL   Comp Metabolic Panel (14) [E]    Collection Time: 12/18/24  9:00 AM   Result Value Ref Range    Glucose 111 (H) 70 - 99 mg/dL    Sodium 138 136 - 145 mmol/L    Potassium 5.1 3.5 - 5.1 mmol/L    Chloride 105 98 - 112 mmol/L    CO2 26.0 21.0 - 32.0 mmol/L    Anion Gap 7 0 - 18 mmol/L    BUN 11 9 - 23 mg/dL    Creatinine 1.14 0.70 - 1.30 mg/dL    Calcium, Total 10.1 8.7 - 10.4 mg/dL    Calculated Osmolality 286 275 - 295 mOsm/kg    eGFR-Cr 76 >=60 mL/min/1.73m2    AST 70 (H) <34 U/L     (H) 10 - 49 U/L    Alkaline Phosphatase 94 45 - 117 U/L    Bilirubin, Total 0.6 0.3 - 1.2 mg/dL    Total Protein 7.7 5.7 - 8.2 g/dL    Albumin 4.6 3.2 - 4.8 g/dL    Globulin  3.1 2.0 - 3.5 g/dL    A/G Ratio 1.5 1.0 - 2.0    Patient Fasting for CMP? No    Lipid Panel [E]    Collection Time: 12/18/24  9:00 AM   Result Value Ref Range    Cholesterol, Total 215 (H) <200 mg/dL    HDL Cholesterol 56 40 - 59 mg/dL    Triglycerides 110 30 - 149 mg/dL    LDL Cholesterol 139 (H) <100 mg/dL    VLDL 20 0 - 30 mg/dL    Non HDL Chol 159 (H) <130 mg/dL    Patient Fasting for Lipid? No    Vitamin D, 25-Hydroxy    Collection Time: 12/18/24  9:00 AM   Result Value Ref Range    Vitamin D, 25OH, Total 31.2 30.0 - 100.0 ng/mL         Current Outpatient Medications   Medication  Sig Dispense Refill    aspirin (ASPIR-LOW) 81 MG Oral Tab EC       Cholecalciferol (VITAMIN D3) 50 MCG (2000 UT) Oral Chew Tab       Cyanocobalamin (VITAMIN B-12) 1000 MCG/15ML Oral Liquid       Loratadine 10 MG Oral Cap       Omega-3 Fatty Acids (FISH OIL) 1200 MG Oral Cap       NON FORMULARY Take 1 capsule by mouth daily. D3 125 MCG (5000 IU) + K2 100 MCG (MK-7)      SYMBICORT 160-4.5 MCG/ACT Inhalation Aerosol Inhale 2 puffs into the lungs 2 (two) times daily. 3 each 1    Phentermine HCl 37.5 MG Oral Tab Take 1 tablet (37.5 mg total) by mouth every morning before breakfast. 30 tablet 1    AZELASTINE 0.1 % Nasal Solution USE 2 SPRAYS IN EACH NOSTRIL TWICE DAILY 90 mL 0    Fluticasone Propionate 50 MCG/ACT Nasal Suspension 2 sprays by Each Nare route daily. 3 Bottle 3      Allergies   Allergen Reactions    Pcn [Bicillin L-A]      Pt not allergic. Parents were      Past Medical History:    Abdominal hernia    AR (allergic rhinitis)    Bloating    Calculus of kidney    Chest pain    Esophageal ring    GERD (gastroesophageal reflux disease)    Heartburn    HIGH CHOLESTEROL    not on medication    Hyperlipidemia    Kidney stone    ALVAREZ (nonalcoholic steatohepatitis)    Plantar fasciitis    Problems with swallowing    Sleep apnea    Unspecified sleep apnea    AHI 23.7 SaO2 87% CPAP 8 [facility]      Past Surgical History:   Procedure Laterality Date    Lithotripsy      Other surgical history      foot    Other surgical history      lasik    Other surgical history      acl    Other surgical history      wrist,right    Other surgical history  10/22/13    Cystoscopy - Dr. Devi     Up gi endoscopy,dilmaxwell w guide  9/24/13= Esophageal ring    No Jaimes's      Family History   Problem Relation Age of Onset    Diabetes Other     Other (Other) Father         copd    Diabetes Mother       Social History:  Social History     Socioeconomic History    Marital status:     Number of children: 3   Occupational History     Occupation: sales     Comment: [employer] Medical bills   Tobacco Use    Smoking status: Never    Smokeless tobacco: Never   Vaping Use    Vaping status: Never Used   Substance and Sexual Activity    Alcohol use: Yes     Alcohol/week: 3.0 standard drinks of alcohol     Types: 3 Glasses of wine per week    Drug use: No      Occ: . : y. Children: 3.   Exercise:  4 times per week. bike Diet: watches minimally     REVIEW OF SYSTEMS:   GENERAL: feels well otherwise  SKIN: denies any unusual skin lesions  EYES:denies blurred vision or double vision  HEENT: denies nasal congestion, sinus pain or ST  LUNGS: denies shortness of breath with exertion  CARDIOVASCULAR: denies chest pain on exertion  GI: denies abdominal pain,denies heartburn  : denies nocturia or changes in stream  MUSCULOSKELETAL: denies back pain  NEURO: denies headaches  PSYCHE: denies depression or anxiety  HEMATOLOGIC: denies hx of anemia  ENDOCRINE: denies thyroid history  ALL/ASTHMA: denies hx of allergy or asthma    EXAM:   /74 (BP Location: Left arm, Patient Position: Sitting, Cuff Size: large)   Pulse 75   Resp 18   Ht 6' 1\" (1.854 m)   Wt 229 lb 2 oz (103.9 kg)   SpO2 97%   BMI 30.23 kg/m²   Body mass index is 30.23 kg/m².   GENERAL: well developed, well nourished,in no apparent distress  SKIN: no rashes,no suspicious lesions  HEENT: atraumatic, normocephalic,ears and throat are clear  EYES:  EOMI, conjunctiva are clear  NECK: supple,no adenopathy,no bruits, no thyromegaly  CHEST: no chest tenderness  LUNGS: clear to auscultation  CARDIO: RRR without murmur  GI: good BS's,no masses, HSM or tenderness  : Geena C., MA present.  two descended testes,no masses,no hernia,no penile lesions  RECTAL: good rectal tone, prostate shows no masses; mild enlargement  MUSCULOSKELETAL: back is not tender,FROM of the back  EXTREMITIES: no cyanosis, clubbing or edema  NEURO: Oriented times three,cranial nerves are intact,motor and  sensory are grossly intact; 2 + knee reflexes bilaterally  VASCULAR: 2 + dorsalis pedal pulses bilaterally    ASSESSMENT AND PLAN:   Dariel Bray is a 54 year old male who presents for a complete physical exam.    Pt's weight is Body mass index is 30.23 kg/m²., recommended low fat diet and aerobic exercise 30 minutes three times weekly.   Health maintenance, will check:   Orders Placed This Encounter   Procedures    CBC With Differential With Platelet    Comp Metabolic Panel (14)    TSH W Reflex To Free T4    Lipid Panel    Vitamin D, 25-Hydroxy    PSA, Total W Reflex To Free    Hemoglobin A1C    Urinalysis, Routine       Colonoscopy -- due in 2034  [facility] 2021 score of 0   HCV 2007 negative    Last eye exam: 7/2024  Last dental exam: due  Advised skin exam.  Advised to see ENT.    Encounter Diagnoses   Name Primary?    Routine general medical examination at a health care facility Yes    Vitamin D deficiency     Screening for genitourinary condition     Laboratory examination ordered as part of a routine general medical examination     Screening for malignant neoplasm of prostate     Eosinophilic asthma (HCC)     Class 1 obesity due to excess calories without serious comorbidity with body mass index (BMI) of 31.0 to 31.9 in adult     Dietary counseling     Exercise counseling        Orders Placed This Encounter   Procedures    CBC With Differential With Platelet    Comp Metabolic Panel (14)    TSH W Reflex To Free T4    Lipid Panel    Vitamin D, 25-Hydroxy    PSA, Total W Reflex To Free    Hemoglobin A1C    Urinalysis, Routine       Meds & Refills for this Visit:  Requested Prescriptions     Signed Prescriptions Disp Refills    SYMBICORT 160-4.5 MCG/ACT Inhalation Aerosol 3 each 1     Sig: Inhale 2 puffs into the lungs 2 (two) times daily.    Phentermine HCl 37.5 MG Oral Tab 30 tablet 1     Sig: Take 1 tablet (37.5 mg total) by mouth every morning before breakfast.       Imaging & Consults:  None      The  patient indicates understanding of these issues and agrees to the plan.  The patient is asked to return in Return in about 4 weeks (around 4/9/2025) for OM -15.  .

## 2025-03-13 LAB
PROSTATE SPECIFIC AG: 0.6 NG/ML
PROSTATE SPECIFIC AG: 0.6 NG/ML

## 2025-03-13 NOTE — PROGRESS NOTES
Dariel Bray  2024 - 2025  Patient ID: [id number]  : 1970  Age: 54 years  [facility] - [place]  [address] [place] [address]  [place]  [place], [address]  Phone: [phone]  Email: [email or web address]  Compliance Report  Compliance  Payor Standard  Usage 2024 - 2025  Usage days 43/90 days (48%)  >= 4 hours 43 days (48%)  < 4 hours 0 days (0%)  Usage hours 328 hours 42 minutes  Average usage (total days) 3 hours 39 minutes  Average usage (days used) 7 hours 39 minutes  Median usage (days used) 7 hours 33 minutes  Total used hours (value since last reset - 2025) 9,854 hours  AirSense 10 AutoSet  Serial number [id number]  Mode AutoSet  Min Pressure 6 cmH2O  Max Pressure 12 cmH2O  EPR Fulltime  EPR level 2  Response Standard  Therapy  Pressure - cmH2O Median: 7.4 95th percentile: 10.2 Maximum: 11.3  Leaks - L/min Median: 7.5 95th percentile: 19.1 Maximum: 27.0  Events per hour AI: 0.1 HI: 0.1 AHI: 0.2  Apnea Index Central: 0.0 Obstructive: 0.1 Unknown: 0.0  RERA Index 0.0  Cheyne-Ryao respiration (average duration per night) 0 minutes (0%)

## 2025-03-13 NOTE — PROGRESS NOTES
[facility]  SLEEP PROGRESS NOTE        HPI:   This is a 54 year old male coming in for   Chief Complaint   Patient presents with    Obstructive Sleep Apnea (JP)     Follow up, sleep questioner , pt states he has been getting headaches from his machine and also states he does not put water in machine anymore cause it gives him cough, pt states his breathing is better        HPI:     Machine is several years old - set up 2021  Has been ordering supplies through [employer]     Uses [employer] when traveling- uses a CPAP every night  Works in sales    Patient: Sleep review of systems today: see form.    In bed 10p  Out of bed 6a  SL quick  Nighttime awakenings 1-2x RR, no trouble falling back asleep  Naps none  Caffeine 1-3 cups coffee daily, trying to cut down and switch to decaf tea     Denies leg cramps, restless legs, dry mouth, tinnitus, chest pain, thoracic back pain, bloating, drowsy driving, sleep walking, sleep talking  Teeth grinding - not currently wearing guard   Headaches - back/side of head, worse towards early morning, improves with ibuprofen. Does have allergies - uses nasal saline, flonase, azelastine prn  Bloating in AM    DME company: [employer]   Mask type: nasal mask    Dariel Bray  2024 - 2025  Patient ID: [id number]  : 1970  Age: 54 years  [facility] [place] [address]  Phone: [phone]  Email: [email or web address]  Compliance Report  Compliance  Payor Standard  Usage 2024 - 2025  Usage days 43/90 days (48%)  >= 4 hours 43 days (48%)  < 4 hours 0 days (0%)  Usage hours 328 hours 42 minutes  Average usage (total days) 3 hours 39 minutes  Average usage (days used) 7 hours 39 minutes  Median usage (days used) 7 hours 33 minutes  Total used hours (value since last reset - 2025) 9,854 hours  AirSense 10 AutoSet  Serial number [id number]  Mode AutoSet  Min Pressure 6 cmH2O  Max Pressure 12  cmH2O  EPR Fulltime  EPR level 2  Response Standard  Therapy  Pressure - cmH2O Median: 7.4 95th percentile: 10.2 Maximum: 11.3  Leaks - L/min Median: 7.5 95th percentile: 19.1 Maximum: 27.0  Events per hour AI: 0.1 HI: 0.1 AHI: 0.2  Apnea Index Central: 0.0 Obstructive: 0.1 Unknown: 0.0  RERA Index 0.0  Cheyne-Rayo respiration (average duration per night) 0 minutes (0%)    This is a 54 year old male who presents with the following symptoms, risk factors, behaviors or other items associated with sleep problems.    Sleep Apnea:   (Patient-Rptd) wakes with headache; previous sleep study; current CPAP use  Insomnia:  (Patient-Rptd) waking too early; restless sleep; travel - time zones  Restless Leg:  No data recorded  Parasomnias:   No data recorded  Daytime Problems:  (Patient-Rptd) previous sleep study    The patient's Saxis Sleepiness score is (Patient-Rptd) 2/24.      1/22/2023 CPAP Tx  CLINICAL HISTORY:  Patient is a 42-year-old with a history of daytime  sleepiness.  He had a baseline sleep study which revealed an  apnea-hypopnea index of 23.7.  He presents now for a CPAP titration.  RECOMMENDATIONS:  1.    Patient should be prescribed CPAP at a level of 8 cm of water.  Heated humidity at a level of 3 and EPR at a level of 1 were used      Last CBC  Lab Results   Component Value Date    WBC 7.1 03/12/2025    RBC 5.44 03/12/2025    HGB 15.6 03/12/2025    HCT 46.8 03/12/2025    MCV 86.0 03/12/2025    MCH 28.7 03/12/2025    MCHC 33.3 03/12/2025    RDW 13.8 03/12/2025    .0 03/12/2025        Last CMP/BMP  Lab Results   Component Value Date    GLU 98 03/12/2025    BUN 14 03/12/2025    BUNCREA 14.0 03/04/2021    CREATSERUM 1.16 03/12/2025    ANIONGAP 11 03/12/2025    GFR >59 10/28/2009    GFRNAA 63 09/22/2021    GFRAA 73 09/22/2021    CA 10.0 03/12/2025    OSMOCALC 290 03/12/2025    ALKPHO 94 03/12/2025    AST 58 (H) 03/12/2025    ALT 84 (H) 03/12/2025    BILT 0.6 03/12/2025    TP 8.0 03/12/2025    ALB 5.1  (H) 03/12/2025    GLOBULIN 2.9 03/12/2025    AGRATIO 1.7 10/28/2009     03/12/2025    K 4.5 03/12/2025     03/12/2025    CO2 28.0 03/12/2025       Last iron panel  Lab Results   Component Value Date    IRON 140 01/25/2007     No results found for: \"ALAINA\"      Pt  PCP:  Yudy Covarrubias DO  No referring provider defined for this encounter.           No data to display                  Past Medical History:    Abdominal hernia    AR (allergic rhinitis)    Bloating    Calculus of kidney    Chest pain    Esophageal ring    GERD (gastroesophageal reflux disease)    Heartburn    HIGH CHOLESTEROL    not on medication    Hyperlipidemia    Kidney stone    ALVAREZ (nonalcoholic steatohepatitis)    Plantar fasciitis    Problems with swallowing    Sleep apnea    Unspecified sleep apnea    AHI 23.7 SaO2 87% CPAP 8 [facility]     Past Surgical History:   Procedure Laterality Date    Lithotripsy      Other surgical history      foot    Other surgical history      lasik    Other surgical history      acl    Other surgical history      wrist,right    Other surgical history  10/22/13    Cystoscopy - Dr. Devi     Up gi endoscopy,dilatn w guide  9/24/13= Esophageal ring    No Jaimes's     Social History:  Social History     Social History Narrative    Not on file     Social History     Socioeconomic History    Marital status:     Number of children: 3   Occupational History    Occupation: sales     Comment: Healthcare Medical bills   Tobacco Use    Smoking status: Never    Smokeless tobacco: Never   Vaping Use    Vaping status: Never Used   Substance and Sexual Activity    Alcohol use: Yes     Alcohol/week: 3.0 standard drinks of alcohol     Types: 3 Glasses of wine per week    Drug use: No     Family History:  Family History   Problem Relation Age of Onset    Diabetes Other     Other (Other) Father         copd    Diabetes Mother      Allergies:  Allergies[1]  Current Meds:  Current Outpatient Medications   Medication Sig  Dispense Refill    aspirin (ASPIR-LOW) 81 MG Oral Tab EC       Cholecalciferol (VITAMIN D3) 50 MCG (2000 UT) Oral Chew Tab       Cyanocobalamin (VITAMIN B-12) 1000 MCG/15ML Oral Liquid       Loratadine 10 MG Oral Cap       Omega-3 Fatty Acids (FISH OIL) 1200 MG Oral Cap       NON FORMULARY Take 1 capsule by mouth daily. D3 125 MCG (5000 IU) + K2 100 MCG (MK-7)      SYMBICORT 160-4.5 MCG/ACT Inhalation Aerosol Inhale 2 puffs into the lungs 2 (two) times daily. 3 each 1    Phentermine HCl 37.5 MG Oral Tab Take 1 tablet (37.5 mg total) by mouth every morning before breakfast. 30 tablet 1    AZELASTINE 0.1 % Nasal Solution USE 2 SPRAYS IN EACH NOSTRIL TWICE DAILY 90 mL 0    Fluticasone Propionate 50 MCG/ACT Nasal Suspension 2 sprays by Each Nare route daily. 3 Bottle 3      Counseling given: Not Answered         Problem List:  Patient Active Problem List   Diagnosis    Allergic rhinitis    Hyperlipidemia    ALVAREZ (nonalcoholic steatohepatitis)    Mild persistent asthma without complication (HCC)    Eosinophilic asthma (HCC)    JP on CPAP- moderate, AHI 23.7    Special screening for malignant neoplasm of colon       REVIEW OF SYSTEMS:   Review of Systems  See HPI     EXAM:   /74 (BP Location: Right arm, Patient Position: Sitting, Cuff Size: adult)   Pulse 90   Resp 18   Ht 6' 1\" (1.854 m)   Wt 229 lb (103.9 kg)   SpO2 98%   BMI 30.21 kg/m²  Estimated body mass index is 30.21 kg/m² as calculated from the following:    Height as of this encounter: 6' 1\" (1.854 m).    Weight as of this encounter: 229 lb (103.9 kg).   Neck in inches:      Wt Readings from Last 6 Encounters:   03/14/25 229 lb (103.9 kg)   03/12/25 229 lb 2 oz (103.9 kg)   01/06/25 243 lb (110.2 kg)   12/18/24 240 lb 4 oz (109 kg)   11/18/24 239 lb (108.4 kg)   09/17/24 236 lb 4 oz (107.2 kg)     BP Readings from Last 3 Encounters:   03/14/25 124/74   03/12/25 128/74   01/06/25 132/74     Pulse Readings from Last 3 Encounters:   03/14/25 90    03/12/25 75   01/06/25 76     SpO2 Readings from Last 3 Encounters:   03/14/25 98%   03/12/25 97%   01/06/25 97%      Patient weight not recorded    Vital signs reviewed.  Physical Exam  Vitals and nursing note reviewed.   Constitutional:       Appearance: Normal appearance.   HENT:      Head: Normocephalic and atraumatic.      Right Ear: External ear normal.      Left Ear: External ear normal.   Pulmonary:      Effort: Pulmonary effort is normal. No respiratory distress.   Musculoskeletal:      Cervical back: Normal range of motion and neck supple.   Neurological:      General: No focal deficit present.      Mental Status: He is alert and oriented to person, place, and time.   Psychiatric:         Attention and Perception: Attention and perception normal.         Mood and Affect: Mood and affect normal.         Speech: Speech normal.         Behavior: Behavior normal. Behavior is cooperative.         Thought Content: Thought content normal.         Cognition and Memory: Cognition and memory normal.         Judgment: Judgment normal.         ASSESSMENT AND PLAN:   1. JP on CPAP- moderate, AHI 23.7  - Reduce pressure from 6-12cwp to 6-10cwp to help with AM headaches and AM bloating  - Repeat DL in 2 weeks, follow up with patient regarding sx  - Set up new DME once patient is due for new supplies  - RTO in 1 year - will be due for new CPAP at that point    Patient is using and benefiting from regular cpap use.  Patient was instructed to clean equipment on a weekly basis.  Patient was instructed to keep up to date with supplies.  Patient was informed to avoid drowsy driving, or using heavy machinery.  Patient was instructed to followup on a annual basis.      There are no Patient Instructions on file for this visit.    Independent interpretation of Sleep Download as defined above.  Continue with Rx management of Sleep apnea with PAP therapy.    COMPLIANCE is required by insurance for 4 hours a night most nights of  the week.    Advised if still with sleep apnea and not using CPAP has a 7 fold increase in risk of heart attack, stroke, abnormal heart rhythm  and death,  increased risk of driving accidents.     Advised to refrain from driving when sleepy.      Recommend weight loss, and maintain and optimal BMI with Exercise 30 minutes most days to target heart rate .     Advised patient to change filters,masks,hoses  and tubes and equiptment on a  regular schedule.    Filters and seals shall be changed every 1 month,  Hoses every 3 months,   CPAP mask and humidifier chamber changed every 6 month  with the durable medical equipment provider.         Meds & Refills for this Visit:  Requested Prescriptions      No prescriptions requested or ordered in this encounter       Outcome: Parent verbalizes understanding. Parent is notified to call with any questions, complications, allergies, or worsening or changing symptoms.  Parent is to call with any side effects or complications from the treatments as a result of today.     \" This note was created utilizing Dragon speech recognition software.  Please excuse any grammatical errors. Call my office if you have any questions regarding this note. \"     Jennie Coleman PA-C  3/13/2025  3:33 PM         [1]   Allergies  Allergen Reactions    Pcn [Bicillin L-A]      Pt not allergic. Parents were

## 2025-03-14 ENCOUNTER — TELEPHONE (OUTPATIENT)
Facility: CLINIC | Age: 55
End: 2025-03-14

## 2025-03-14 ENCOUNTER — OFFICE VISIT (OUTPATIENT)
Facility: CLINIC | Age: 55
End: 2025-03-14
Payer: COMMERCIAL

## 2025-03-14 VITALS
HEIGHT: 73 IN | RESPIRATION RATE: 18 BRPM | OXYGEN SATURATION: 98 % | BODY MASS INDEX: 30.35 KG/M2 | WEIGHT: 229 LBS | SYSTOLIC BLOOD PRESSURE: 124 MMHG | HEART RATE: 90 BPM | DIASTOLIC BLOOD PRESSURE: 74 MMHG

## 2025-03-14 DIAGNOSIS — G47.33 OSA ON CPAP: Primary | ICD-10-CM

## 2025-03-14 PROCEDURE — 99214 OFFICE O/P EST MOD 30 MIN: CPT | Performed by: PHYSICIAN ASSISTANT

## 2025-03-14 NOTE — TELEPHONE ENCOUNTER
03/14/2025, 09:38 AM    by DOMINGUEZ CARDOZA LPN    Settings sent to device    Set Mode to AutoSet  Set Min Pressure to 6.0 cmH2O  Set Max Pressure to 10.0 cmH2O  Set Response to Standard  Set EPR to Fulltime  Set EPR level to 2  Set Ramp enable to Auto  Set Start pressure to 6.0 cmH2O  Previous Settings    Set Mode to AutoSet  Set Min Pressure to 6.0 cmH2O  Set Max Pressure to 12.0 cmH2O  Set Response to Standard  Set EPR to Fulltime  Set EPR level to 2  Set Ramp enable to Auto  Set Start pressure to 6.0 cmH2O    [phone] (home)

## 2025-03-20 DIAGNOSIS — E66.09 CLASS 1 OBESITY DUE TO EXCESS CALORIES WITHOUT SERIOUS COMORBIDITY WITH BODY MASS INDEX (BMI) OF 30.0 TO 30.9 IN ADULT: ICD-10-CM

## 2025-03-20 DIAGNOSIS — J82.83 EOSINOPHILIC ASTHMA (HCC): ICD-10-CM

## 2025-03-20 DIAGNOSIS — Z71.82 EXERCISE COUNSELING: ICD-10-CM

## 2025-03-20 DIAGNOSIS — Z71.3 DIETARY COUNSELING: ICD-10-CM

## 2025-03-20 DIAGNOSIS — E66.811 CLASS 1 OBESITY DUE TO EXCESS CALORIES WITHOUT SERIOUS COMORBIDITY WITH BODY MASS INDEX (BMI) OF 30.0 TO 30.9 IN ADULT: ICD-10-CM

## 2025-03-20 RX ORDER — PHENTERMINE HYDROCHLORIDE 37.5 MG/1
37.5 TABLET ORAL
Qty: 30 TABLET | Refills: 1 | OUTPATIENT
Start: 2025-03-20

## 2025-03-20 RX ORDER — BUDESONIDE AND FORMOTEROL FUMARATE DIHYDRATE 160; 4.5 UG/1; UG/1
2 AEROSOL RESPIRATORY (INHALATION) 2 TIMES DAILY
Qty: 3 EACH | Refills: 1 | OUTPATIENT
Start: 2025-03-20

## 2025-03-20 NOTE — TELEPHONE ENCOUNTER
Sent patient a [employer] message that his medications were both sent to the pharmacy on 3/12 from Dr. REESE

## 2025-03-31 NOTE — TELEPHONE ENCOUNTER
Repeat dL from 2025    Dariel Bray  2025 - 2025  Patient ID: [id number]  : 1970  Age: 54 years  [facility] - [place]  [address] [place] [address]  [place]  [place], [address]  Phone: [phone]  Email: [email or web address]  Compliance Report  Compliance  Payor Standard  Usage 2025 - 2025  Usage days 12/15 days (80%)  >= 4 hours 12 days (80%)  < 4 hours 0 days (0%)  Usage hours 89 hours 25 minutes  Average usage (total days) 5 hours 58 minutes  Average usage (days used) 7 hours 27 minutes  Median usage (days used) 7 hours 33 minutes  Total used hours (value since last reset - 2025) 9,958 hours  AirSense 10 AutoSet  Serial number [id number]  Mode AutoSet  Min Pressure 6 cmH2O  Max Pressure 10 cmH2O  EPR Fulltime  EPR level 2  Response Standard  Therapy  Pressure - cmH2O Median: 6.9 95th percentile: 9.1 Maximum: 9.8  Leaks - L/min Median: 15.6 95th percentile: 24.8 Maximum: 30.1  Events per hour AI: 0.1 HI: 0.1 AHI: 0.2  Apnea Index Central: 0.1 Obstructive: 0.0 Unknown: 0.0  RERA Index 0.0  Cheyne-Rayo respiration (average duration per night) 0 minutes (0%)

## 2025-04-02 ENCOUNTER — PATIENT MESSAGE (OUTPATIENT)
Dept: FAMILY MEDICINE CLINIC | Facility: CLINIC | Age: 55
End: 2025-04-02

## 2025-04-02 NOTE — TELEPHONE ENCOUNTER
Pt asking if ok to take ARMRA colostrum supplement with phentermine   Advised Dr MURPHY is out of the office and will follow up next week

## 2025-04-03 DIAGNOSIS — K75.81 NASH (NONALCOHOLIC STEATOHEPATITIS): Primary | ICD-10-CM

## 2025-04-03 DIAGNOSIS — R79.89 ELEVATED LIVER FUNCTION TESTS: ICD-10-CM

## 2025-04-14 ENCOUNTER — OFFICE VISIT (OUTPATIENT)
Dept: FAMILY MEDICINE CLINIC | Facility: CLINIC | Age: 55
End: 2025-04-14
Payer: COMMERCIAL

## 2025-04-14 VITALS
RESPIRATION RATE: 16 BRPM | HEART RATE: 64 BPM | HEIGHT: 75 IN | WEIGHT: 219 LBS | DIASTOLIC BLOOD PRESSURE: 68 MMHG | BODY MASS INDEX: 27.23 KG/M2 | SYSTOLIC BLOOD PRESSURE: 120 MMHG

## 2025-04-14 DIAGNOSIS — Z71.3 DIETARY COUNSELING: ICD-10-CM

## 2025-04-14 DIAGNOSIS — Z79.899 MEDICATION MANAGEMENT: ICD-10-CM

## 2025-04-14 DIAGNOSIS — Z71.82 EXERCISE COUNSELING: ICD-10-CM

## 2025-04-14 DIAGNOSIS — E66.3 OVERWEIGHT (BMI 25.0-29.9): Primary | ICD-10-CM

## 2025-04-14 PROCEDURE — 99213 OFFICE O/P EST LOW 20 MIN: CPT | Performed by: FAMILY MEDICINE

## 2025-04-14 NOTE — PROGRESS NOTES
[facility], [address] [address]  Dept: [phone]       Patient:  Dariel Bray  :      1970  MRN:      [id number]    Chief Complaint:    Chief Complaint   Patient presents with    Weight Check     Doing well       SUBJECTIVE     History of Present Illness:  Dariel is being seen today for a follow-up for weight.  He is meal prepping.  Walking 6 miles per day.  Working out at the hotel when traveling.  Has a scale with [employer].  Uploading to [employer].  Using [employer] Approved [employer].              Past Medical History:   Past Medical History:    Abdominal hernia    AR (allergic rhinitis)    Bloating    Calculus of kidney    Chest pain    Esophageal ring    GERD (gastroesophageal reflux disease)    Heartburn    HIGH CHOLESTEROL    not on medication    Hyperlipidemia    Kidney stone    ALVAREZ (nonalcoholic steatohepatitis)    Plantar fasciitis    Problems with swallowing    Sleep apnea    Unspecified sleep apnea    AHI 23.7 SaO2 87% CPAP 8 [facility]          OBJECTIVE     Vitals: /68 (BP Location: Left arm, Patient Position: Sitting, Cuff Size: large)   Pulse 64   Resp 16   Ht 6' 3\" (1.905 m)   Wt 219 lb (99.3 kg)   BMI 27.37 kg/m²     Initial weight loss: 10   Total weight loss: 32   Start weight: 251    Wt Readings from Last 3 Encounters:   25 219 lb (99.3 kg)   25 229 lb (103.9 kg)   25 229 lb 2 oz (103.9 kg)       Patient Medications:    Current Outpatient Medications   Medication Sig Dispense Refill    aspirin (ASPIR-LOW) 81 MG Oral Tab EC       Cholecalciferol (VITAMIN D3) 50 MCG (2000 UT) Oral Chew Tab       Cyanocobalamin (VITAMIN B-12) 1000 MCG/15ML Oral Liquid       Loratadine 10 MG Oral Cap       Omega-3 Fatty Acids (FISH OIL) 1200 MG Oral Cap       NON FORMULARY Take 1 capsule by mouth daily. D3 125 MCG (5000 IU) + K2 100 MCG (MK-7)      SYMBICORT 160-4.5 MCG/ACT Inhalation Aerosol  Inhale 2 puffs into the lungs 2 (two) times daily. 3 each 1    Phentermine HCl 37.5 MG Oral Tab Take 1 tablet (37.5 mg total) by mouth every morning before breakfast. 30 tablet 1    AZELASTINE 0.1 % Nasal Solution USE 2 SPRAYS IN EACH NOSTRIL TWICE DAILY 90 mL 0    Fluticasone Propionate 50 MCG/ACT Nasal Suspension 2 sprays by Each Nare route daily. 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   Tobacco Use    Smoking status: Never     Passive exposure: Never    Smokeless tobacco: Never   Vaping Use    Vaping status: Never Used   Substance and Sexual Activity    Alcohol use: Yes     Alcohol/week: 3.0 standard drinks of alcohol     Types: 3 Glasses of wine per week     Comment: social-cutting back to only 1 glass of wine    Drug use: No    Sexual activity: Yes   Other Topics Concern    Not on file   Social History Narrative    Not on file     Social Drivers of Health     Food Insecurity: No Food Insecurity (4/14/2025)    NCSS - Food Insecurity     Worried About Running Out of Food in the Last Year: No     Ran Out of Food in the Last Year: No   Transportation Needs: No Transportation Needs (4/14/2025)    NCSS - Transportation     Lack of Transportation: No   Stress: Not on file   Housing Stability: Not At Risk (4/14/2025)    NCSS - Housing/Utilities     Has Housing: Yes     Worried About Losing Housing: No     Unable to Get Utilities: No     Surgical History:    Past Surgical History:   Procedure Laterality Date    Lithotripsy      Other surgical history      foot    Other surgical history      lasik    Other surgical history      acl    Other surgical history      wrist,right    Other surgical history  10/22/13     Cystoscopy - Dr. Devi     Up gi endoscopy,pete mason guide  9/24/13= Esophageal ring    No Jaimes's     Family History:    Family History   Problem Relation Age of Onset    Diabetes Other     Other (Other) Father         copd    Diabetes Mother      Food Journal  Reviewed and Discussed:          Patient has a Food Journal?: knows what he's eating, travels a lot  Patient is reading nutrition labels?  yes  Average Caloric Intake: ?       uses [employer]  Average CHO Intake: ? Uses [employer]ana  Is patient exercising?  yes  Type of exercise? Aerobic+weights     Eating Habits  Patient states the following:  Meals a day? 2-3/inter.fasting  Length of time it takes to consume a meal: 20  # of snacks per day: **     Type of snacks:    Amount of soda consumption per day:  none  Amount of water (in ounces) per day:  64 oz or more  Drinking between meals only:   Toughest challenge:  cost of better food. [employer] mostly    ROS:    Pertinent items are noted in HPI.  A comprehensive review of systems was negative.  All other systems were reviewed and are negative    Physical Exam:   /68 (BP Location: Left arm, Patient Position: Sitting, Cuff Size: large)   Pulse 64   Resp 16   Ht 6' 3\" (1.905 m)   Wt 219 lb (99.3 kg)   BMI 27.37 kg/m²   General appearance: alert, appears stated age, and cooperative  Head: Normocephalic, without obvious abnormality, atraumatic  Neck: no adenopathy, no carotid bruit, no JVD, supple, symmetrical, trachea midline, and thyroid mildy enlarged, symmetric, no tenderness/mass/nodules  Lungs: clear to auscultation bilaterally  Heart: S1, S2 normal, no murmur, click, rub or gallop, regular rate and rhythm  Abdomen: soft, non-tender; bowel sounds normal; no masses,  no organomegaly  Extremities: extremities normal, atraumatic, no cyanosis or edema  Skin: Skin color, texture, turgor normal. No rashes or lesions    ASSESSMENT/PLAN     Encounter Diagnoses   Name Primary?    Overweight (BMI 25.0-29.9) Yes    Dietary  counseling     Exercise counseling     Medication management              No orders of the defined types were placed in this encounter.      Nutritional Goals  Calorie-controlled diet:  1800    ON phentermine full tab.  Monitor BP.    Discussed losing 2-4 lbs in 1 month.  Discussed to focus on diet and exercise.    Meds refilled.        Behavior Modifications Reviewed and Discussed  Drink 64 oz of water per day, Utlize portion control strategies to reduce calorie intake, Identify triggers for eating and manage cues, and Eat slowly and take 20 to 30 minutes to complete each meal    Exercise Goals Reviewed and Discussed    Walk for  30 Minutes    Follow up with ortho or left knee discomfort.  Dr. Luis Norwood  [facility]      Meds & Refills for this Visit:  Requested Prescriptions      No prescriptions requested or ordered in this encounter       Imaging & Consults:  None              Return in about 4 weeks (around 5/12/2025) for OM -15.

## 2025-04-14 NOTE — PROGRESS NOTES
Food Journal  Reviewed and Discussed:          Patient has a Food Journal?: knows what he's eating, travels a lot  Patient is reading nutrition labels?  yes  Average Caloric Intake: ?       uses [employer]OXANA  Average CHO Intake: ? Uses [employer]xana  Is patient exercising?  yes  Type of exercise? Aerobic+weights     Eating Habits  Patient states the following:  Meals a day? 2-3/inter.fasting  Length of time it takes to consume a meal: 20  # of snacks per day: **     Type of snacks:    Amount of soda consumption per day:  none  Amount of water (in ounces) per day:  64 oz or more  Drinking between meals only:   Toughest challenge:  cost of better food. [employer] mostly

## 2025-04-16 DIAGNOSIS — E66.811 CLASS 1 OBESITY DUE TO EXCESS CALORIES WITHOUT SERIOUS COMORBIDITY WITH BODY MASS INDEX (BMI) OF 30.0 TO 30.9 IN ADULT: ICD-10-CM

## 2025-04-16 DIAGNOSIS — E66.09 CLASS 1 OBESITY DUE TO EXCESS CALORIES WITHOUT SERIOUS COMORBIDITY WITH BODY MASS INDEX (BMI) OF 30.0 TO 30.9 IN ADULT: ICD-10-CM

## 2025-04-16 DIAGNOSIS — Z71.3 DIETARY COUNSELING: ICD-10-CM

## 2025-04-16 DIAGNOSIS — Z71.82 EXERCISE COUNSELING: ICD-10-CM

## 2025-04-16 RX ORDER — PHENTERMINE HYDROCHLORIDE 37.5 MG/1
37.5 TABLET ORAL
Qty: 30 TABLET | Refills: 1 | Status: SHIPPED | OUTPATIENT
Start: 2025-04-16

## 2025-04-16 NOTE — TELEPHONE ENCOUNTER
Last office visit: 4/14/25   Protocol: pass  Requested medication(s) are due for refill today: yes  Requested medication(s) are on the active medication list same strength, form, dose/ sig: yes  Requested medication(s) are managed by provider: yes  Patient has already received a courtsey refill: no    NOV: 5/19/25    Asked to Return: 5/12/25

## 2025-04-25 ENCOUNTER — ORDER TRANSCRIPTION (OUTPATIENT)
Dept: ADMINISTRATIVE | Facility: HOSPITAL | Age: 55
End: 2025-04-25

## 2025-04-25 DIAGNOSIS — Z13.6 SCREENING FOR CARDIOVASCULAR CONDITION: Primary | ICD-10-CM

## 2025-04-28 ENCOUNTER — HOSPITAL ENCOUNTER (OUTPATIENT)
Dept: ULTRASOUND IMAGING | Age: 55
Discharge: HOME | End: 2025-04-28
Attending: FAMILY MEDICINE
Payer: COMMERCIAL

## 2025-04-28 DIAGNOSIS — R79.89 ELEVATED LIVER FUNCTION TESTS: ICD-10-CM

## 2025-04-28 DIAGNOSIS — K75.81 NASH (NONALCOHOLIC STEATOHEPATITIS): ICD-10-CM

## 2025-04-28 PROCEDURE — 76981 USE PARENCHYMA: CPT | Performed by: FAMILY MEDICINE

## 2025-04-28 PROCEDURE — 76705 ECHO EXAM OF ABDOMEN: CPT | Performed by: FAMILY MEDICINE

## 2025-04-29 ENCOUNTER — PATIENT MESSAGE (OUTPATIENT)
Dept: FAMILY MEDICINE CLINIC | Facility: CLINIC | Age: 55
End: 2025-04-29

## 2025-04-29 DIAGNOSIS — K74.00 LIVER FIBROSIS: Primary | ICD-10-CM

## 2025-04-30 ENCOUNTER — HOSPITAL ENCOUNTER (OUTPATIENT)
Dept: CT IMAGING | Facility: HOSPITAL | Age: 55
Discharge: HOME | End: 2025-04-30
Attending: FAMILY MEDICINE

## 2025-04-30 ENCOUNTER — PATIENT MESSAGE (OUTPATIENT)
Dept: FAMILY MEDICINE CLINIC | Facility: CLINIC | Age: 55
End: 2025-04-30

## 2025-04-30 VITALS — BODY MASS INDEX: 26.61 KG/M2 | WEIGHT: 214 LBS | HEIGHT: 75 IN

## 2025-04-30 DIAGNOSIS — Z13.6 SCREENING FOR CARDIOVASCULAR CONDITION: ICD-10-CM

## 2025-04-30 LAB
GLUCOSE POC: 89 MG/DL (ref 70–100)
HDL POC: 51 MG/DL (ref 40–60)
LDL POC: 106 MG/DL (ref 0–130)
TC/HDL RATIO: 3 (ref 0–5)
TOTAL CHOLESTEROL POC: 168 MG/DL (ref 0–200)
TRIGLYCERIDES POC: 57 MG/DL (ref 0–200)

## 2025-04-30 NOTE — PROGRESS NOTES
Date of Service 4/30/2025    RON DAVIS  Date of Birth 9/21/1970    Patient Age: 54 year old    PCP: Yudy Covarrubias DO  [address]  [facility] [address]    Heart Scan Consult  Preliminary Heart Scan Score: 0    Previous Screening  Heart Scan Completed Previously: Yes  Year of last heart scan: 2021  Score of last heart scan: 0  Peripheral Vascular Scan Completed Previously: No          Risk Factors  Personal Risk Factors  Non-alterable Risk Factors: Age, Gender, Family History (Mother has high cholesterol and has 3 stents)  Alterable Risk Factors: Abnormal Cholesterol, Obesity, Stress      Body Mass Index  Body mass index is 26.75 kg/m².    Blood Pressure  Blood Pressure measurement declined during this encounter.  (Normal =< 120/80,  Elevated = 120-129/ >80,  High Stage1 130-139/80-89 , Stage2 >140/>90)    Lipid Profile  Patient was in fasting state: No    Cholesterol: 168, done on 4/30/2025.  HDL Cholesterol: 51, done on 4/30/2025.  LDL Cholesterol: 106, done on 4/30/2025.  TriGlycerides 57, done on 4/30/2025.    Cholesterol Goals  Value   Total  =< 200   HDL  = > 45 Men = > 55 Women   LDL   =< 100   Triglycerides  =< 150       Glucose and Hemoglobin A1C  Lab Results   Component Value Date    GLU 89 04/30/2025    A1C 5.6 03/12/2025     (Normal Fasting Glucose < 100mg/dl )    Nurse Review  Risk factor information and results reviewed with Nurse: Yes    Recommended Follow Up:  Consult your physician regarding:: Final Heart Scan Report, Discuss potential for Incidental Finding, Discuss Potential for Score Variance      Recommendations for Change:  Nutrition Changes: No Change Needed (Eats mostly chicken, fish, occasional beef. Uses avocado and olive oils. Oatmeal with flax and lito seeds. Berries and salads)    Cholesterol Modification (goal of therapy depends upon your risk): Decrease LDL (Lousy/Bad) Ideal <100    Exercise: No Change Needed (Bikes regularly)    Smoking Cessation: No Change  Needed    Weight Management: Decrease Current Weight (Working with PCP to lose weight and has lost about 40 pounds)    Stress Management: Adopt Stress Management Techniques    Repeat Heart Scan: 5 years if Calcium Score is 0.0, Discuss with your Physician              [facility] Recommended Resources:  Recommended Resources: Upcoming Classes, Medical Services and Health Library [email or web address]     Final heart scan report may take 2 weeks.      Chata HO RN        Please Contact the Nurse Heart Line with any Questions or Concerns [phone].

## 2025-04-30 NOTE — PROGRESS NOTES
Dear Dariel,    Your ultrafast heart scan over read shows a hiatal hernia.  Be aware, if  there is any reflux symptoms, the hiatal hernia may contribute to that.  You also have a fatty liver.  Just focus on diet and exercise    Sincerely,  Dr. Covarrubias

## 2025-05-19 ENCOUNTER — APPOINTMENT (OUTPATIENT)
Dept: SURGERY | Facility: CLINIC | Age: 55
End: 2025-05-19

## 2025-05-19 DIAGNOSIS — R79.89 ELEVATED LFTS: Primary | ICD-10-CM

## 2025-05-22 DIAGNOSIS — E66.811 CLASS 1 OBESITY DUE TO EXCESS CALORIES WITHOUT SERIOUS COMORBIDITY WITH BODY MASS INDEX (BMI) OF 30.0 TO 30.9 IN ADULT: ICD-10-CM

## 2025-05-22 DIAGNOSIS — E66.09 CLASS 1 OBESITY DUE TO EXCESS CALORIES WITHOUT SERIOUS COMORBIDITY WITH BODY MASS INDEX (BMI) OF 30.0 TO 30.9 IN ADULT: ICD-10-CM

## 2025-05-22 DIAGNOSIS — Z71.3 DIETARY COUNSELING: ICD-10-CM

## 2025-05-22 DIAGNOSIS — J82.83 EOSINOPHILIC ASTHMA (HCC): ICD-10-CM

## 2025-05-22 DIAGNOSIS — Z71.82 EXERCISE COUNSELING: ICD-10-CM

## 2025-05-22 RX ORDER — BUDESONIDE AND FORMOTEROL FUMARATE DIHYDRATE 160; 4.5 UG/1; UG/1
2 AEROSOL RESPIRATORY (INHALATION) 2 TIMES DAILY
Qty: 3 EACH | Refills: 1 | Status: SHIPPED | OUTPATIENT
Start: 2025-05-22

## 2025-05-22 RX ORDER — PHENTERMINE HYDROCHLORIDE 37.5 MG/1
37.5 TABLET ORAL
Qty: 30 TABLET | Refills: 1 | Status: SHIPPED | OUTPATIENT
Start: 2025-05-22

## 2025-05-22 NOTE — TELEPHONE ENCOUNTER
Last office visit: 4/14/25   Protocol: fail  Requested medication(s) are due for refill today: yes  Requested medication(s) are on the active medication list same strength, form, dose/ sig: yes  Requested medication(s) are managed by provider: yes  Patient has already received a courtsey refill: no    NOV: 6/16/25    Asked to Return: 5/12/25

## 2025-06-13 RX ORDER — MULTIVIT WITH MINERALS/LUTEIN
400 TABLET ORAL DAILY
COMMUNITY

## 2025-06-13 RX ORDER — MAGNESIUM GLYCINATE 100 MG
CAPSULE ORAL AS DIRECTED
COMMUNITY

## 2025-06-16 ENCOUNTER — OFFICE VISIT (OUTPATIENT)
Dept: FAMILY MEDICINE CLINIC | Facility: CLINIC | Age: 55
End: 2025-06-16
Payer: COMMERCIAL

## 2025-06-16 VITALS
BODY MASS INDEX: 27 KG/M2 | HEIGHT: 75 IN | OXYGEN SATURATION: 98 % | SYSTOLIC BLOOD PRESSURE: 120 MMHG | HEART RATE: 75 BPM | WEIGHT: 217.13 LBS | DIASTOLIC BLOOD PRESSURE: 70 MMHG | RESPIRATION RATE: 18 BRPM

## 2025-06-16 DIAGNOSIS — Z71.82 EXERCISE COUNSELING: ICD-10-CM

## 2025-06-16 DIAGNOSIS — Z71.3 DIETARY COUNSELING: ICD-10-CM

## 2025-06-16 DIAGNOSIS — M77.11 LATERAL EPICONDYLITIS OF RIGHT ELBOW: ICD-10-CM

## 2025-06-16 DIAGNOSIS — E66.3 OVERWEIGHT (BMI 25.0-29.9): Primary | ICD-10-CM

## 2025-06-16 DIAGNOSIS — Z86.39 HISTORY OF OBESITY: ICD-10-CM

## 2025-06-16 DIAGNOSIS — Z79.899 MEDICATION MANAGEMENT: ICD-10-CM

## 2025-06-16 RX ORDER — PHENTERMINE HYDROCHLORIDE 37.5 MG/1
37.5 TABLET ORAL
Qty: 30 TABLET | Refills: 1 | Status: SHIPPED | OUTPATIENT
Start: 2025-06-16

## 2025-06-16 NOTE — PROGRESS NOTES
Food Journal  Reviewed and Discussed:          Patient has a Food Journal?: no   Patient is reading nutrition labels?  yes  Average Caloric Intake: ?  unsure  Average CHO Intake: ? Unsure   Is patient exercising?  yes  Type of exercise? Aerobic+weights     Eating Habits  Patient states the following:  Meals a day? 2-3  Length of time it takes to consume a meal: 20  # of snacks per day: 0-1     Type of snacks:  nuts, fruits, etc   Amount of soda consumption per day:  none  Amount of water (in ounces) per day:  64 oz or more  Drinking between meals only:   Toughest challenge:  Travelling a lot          The following individual(s) verbally consented to be recorded using ambient AI listening technology and understand that they can each withdraw their consent to this listening technology at any point by asking the clinician to turn off or pause the recording:    Patient name: Dariel Isaac Bray

## 2025-06-16 NOTE — PROGRESS NOTES
[facility], [address] [address]  Dept: [phone]       Patient:  Dariel Bray  :      1970  MRN:      [id number]    Chief Complaint:    Chief Complaint   Patient presents with    Weight Check       SUBJECTIVE     History of Present Illness:  Dariel is being seen today for a follow-up for weight.    History of Present Illness  Jay Bray is a 54 year old male who presents with weight management and elbow pain.    He currently weighs 216 pounds, a slight decrease from his last recorded weight of 219 pounds on . He had reached 212 pounds two weeks ago but has been traveling for work over the past three weeks, which has impacted his weight. Despite this, he maintains physical activity by walking three to five miles regularly and engaging in gardening activities, which he describes as physically demanding. He has not been riding his bike recently but continues to work in his garden, performing tasks such as carrying heavy bags of soil.    He is taking a full phentermine tablet daily and consumes a total of nine pills each morning. He has been eating more red meat recently but plans to incorporate more seafood and produce from his garden into his diet. He maintains a food journal intermittently and acknowledges the need for more consistency.    He experiences significant elbow pain, described as 'bothering me bad, like right in here,' persisting for ten months. The pain is located in the elbow area and affects his ability to use his right arm. He uses various braces, ice, and ibuprofen for management, but the pain feels like it's 'resting' and becomes problematic with use.    He has been traveling frequently for work but is currently home for a couple of weeks. He engages in regular physical activity, including walking and gardening. He has a large garden where he grows a variety of vegetables  and herbs, which he uses to maintain a healthy diet. No issues with blood pressure reported. No other specific symptoms were discussed.              Past Medical History:   Past Medical History:    Abdominal hernia    AR (allergic rhinitis)    Bloating    Calculus of kidney    Chest pain    Esophageal ring    GERD (gastroesophageal reflux disease)    Heartburn    HIGH CHOLESTEROL    not on medication    Hyperlipidemia    Kidney stone    ALVAREZ (nonalcoholic steatohepatitis)    Plantar fasciitis    Problems with swallowing    Sleep apnea    Unspecified sleep apnea    AHI 23.7 SaO2 87% CPAP 8 [facility]          OBJECTIVE     Vitals: /70 (BP Location: Left arm, Patient Position: Sitting, Cuff Size: large)   Pulse 75   Resp 18   Ht 6' 3\" (1.905 m)   Wt 217 lb 2 oz (98.5 kg)   SpO2 98%   BMI 27.14 kg/m²     Initial weight loss: 2   Total weight loss: 34   Start weight: 251    Wt Readings from Last 3 Encounters:   06/16/25 217 lb 2 oz (98.5 kg)   04/30/25 214 lb (97.1 kg)   04/14/25 219 lb (99.3 kg)       Patient Medications:    Current Outpatient Medications   Medication Sig Dispense Refill    Phentermine HCl 37.5 MG Oral Tab Take 1 tablet (37.5 mg total) by mouth every morning before breakfast. 30 tablet 1    APPLE CIDER VINEGAR OR Take by mouth As Directed.      Misc Natural Products (GERMANIUM OR) Take by mouth As Directed.      Resmetirom 80 MG Oral Tab Take 80 mg by mouth daily.      Vitamin D-Vitamin K (D3 + K2) 125-100 MCG Oral Cap Take by mouth As Directed.      vitamin E 400 UNITS Oral Cap Take 1 capsule (400 Units total) by mouth daily.      SYMBICORT 160-4.5 MCG/ACT Inhalation Aerosol Inhale 2 puffs into the lungs 2 (two) times daily. 3 each 1    aspirin (ASPIR-LOW) 81 MG Oral Tab EC       Cholecalciferol (VITAMIN D3) 50 MCG (2000 UT) Oral Chew Tab       Cyanocobalamin (VITAMIN B-12) 1000 MCG/15ML Oral Liquid       Loratadine 10 MG Oral Cap       Omega-3 Fatty Acids (FISH OIL) 1200 MG Oral Cap        NON FORMULARY Take 1 capsule by mouth daily. D3 125 MCG (5000 IU) + K2 100 MCG (MK-7)      AZELASTINE 0.1 % Nasal Solution USE 2 SPRAYS IN EACH NOSTRIL TWICE DAILY 90 mL 0    Fluticasone Propionate 50 MCG/ACT Nasal Suspension 2 sprays by Each Nare route daily. 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   Tobacco Use    Smoking status: Never     Passive exposure: Never    Smokeless tobacco: Never   Vaping Use    Vaping status: Never Used   Substance and Sexual Activity    Alcohol use: Yes     Alcohol/week: 3.0 standard drinks of alcohol     Types: 3 Glasses of wine per week     Comment: social-cutting back to only 1 glass of wine    Drug use: No    Sexual activity: Yes   Other Topics Concern    Not on file   Social History Narrative    Not on file     Social Drivers of Health     Food Insecurity: No Food Insecurity (4/14/2025)    NCSS - Food Insecurity     Worried About Running Out of Food in the Last Year: No     Ran Out of Food in the Last Year: No   Transportation Needs: No Transportation Needs (4/14/2025)    NCSS - Transportation     Lack of Transportation: No   Stress: Not on file   Housing Stability: Not At Risk (4/14/2025)    NCSS - Housing/Utilities     Has Housing: Yes     Worried About Losing Housing: No     Unable to Get Utilities: No     Surgical History:    Past Surgical History:   Procedure Laterality Date    Lithotripsy      Other surgical history      foot    Other surgical history      lasik    Other surgical history      acl    Other surgical history      wrist,right    Other surgical history  10/22/13    Cystoscopy - Dr. Devi     Up gi endoscopy,pete w guide  9/24/13= Esophageal ring    No  Jaimes's     Family History:    Family History   Problem Relation Age of Onset    Diabetes Other     Other (Other) Father         copd    Diabetes Mother      Food Journal  Reviewed and Discussed:          Patient has a Food Journal?: no   Patient is reading nutrition labels?  yes  Average Caloric Intake: ?  unsure  Average CHO Intake: ? Unsure   Is patient exercising?  yes  Type of exercise? Aerobic+weights     Eating Habits  Patient states the following:  Meals a day? 2-3  Length of time it takes to consume a meal: 20  # of snacks per day: 0-1     Type of snacks:  nuts, fruits, etc   Amount of soda consumption per day:  none  Amount of water (in ounces) per day:  64 oz or more  Drinking between meals only:   Toughest challenge:  Travelling a lot         The following individual(s) verbally consented to be recorded using ambient AI listening technology and understand that they can each withdraw their consent to this listening technology at any point by asking the clinician to turn off or pause the recording:    Patient name: Dariel Bray      ROS:    Pertinent items are noted in HPI.  A comprehensive review of systems was negative.  All other systems were reviewed and are negative    Physical Exam:   /70 (BP Location: Left arm, Patient Position: Sitting, Cuff Size: large)   Pulse 75   Resp 18   Ht 6' 3\" (1.905 m)   Wt 217 lb 2 oz (98.5 kg)   SpO2 98%   BMI 27.14 kg/m²   General appearance: alert, appears stated age, and cooperative  Head: Normocephalic, without obvious abnormality, atraumatic  Neck: no adenopathy, no carotid bruit, no JVD, supple, symmetrical, trachea midline, and thyroid mildy enlarged, symmetric, no tenderness/mass/nodules  Lungs: clear to auscultation bilaterally  Heart: S1, S2 normal, no murmur, click, rub or gallop, regular rate and rhythm  Abdomen: soft, non-tender; bowel sounds normal; no masses,  no organomegaly  Extremities: extremities normal, atraumatic, no cyanosis  or edema  Skin: Skin color, texture, turgor normal. No rashes or lesions    ASSESSMENT/PLAN     Encounter Diagnoses   Name Primary?    Overweight (BMI 25.0-29.9) Yes    History of obesity     Lateral epicondylitis of right elbow     Dietary counseling     Exercise counseling     Medication management              No orders of the defined types were placed in this encounter.      Nutritional Goals  Calorie-controlled diet:  1800    ON phentermine full tab.  Discussed losing 2-4 lbs in 1 month.  Discussed to focus on diet and exercise.  Advised ortho for his right elbow since bothering him for 10 months.  Meds refilled.        Behavior Modifications Reviewed and Discussed  Drink 64 oz of water per day, Utlize portion control strategies to reduce calorie intake, Identify triggers for eating and manage cues, and Eat slowly and take 20 to 30 minutes to complete each meal    Exercise Goals Reviewed and Discussed    Walk for  30 Minutes          Meds & Refills for this Visit:  Requested Prescriptions     Signed Prescriptions Disp Refills    Phentermine HCl 37.5 MG Oral Tab 30 tablet 1     Sig: Take 1 tablet (37.5 mg total) by mouth every morning before breakfast.       Imaging & Consults:  ORTHOPEDIC - INTERNAL      Assessment & Plan  Lateral Epicondylitis  Chronic right elbow lateral epicondylitis with significant discomfort and functional limitation. No improvement with conservative measures.  - Refer to orthopedic specialist, Dr. Doyle, for evaluation and possible injection therapy.    Overiweght  Weight at 217 pounds, slight increase from 214 pounds. Active through gardening and walking. Encouraged weight management efforts.  - Continue current exercise regimen and increase consistency.  - Resume food journaling to monitor dietary intake.  - Set a goal to lose two pounds by the next appointment.    General Health Maintenance  Engaged in lifestyle modifications. Compliant with phentermine without adverse effects. Recent  labs in March.  - Continue current medication regimen, including phentermine.  - Monitor blood pressure regularly.  - Encourage continued engagement in physical activities and healthy eating habits.            Return in about 4 weeks (around 7/14/2025) for OM -15.

## 2025-07-14 ENCOUNTER — OFFICE VISIT (OUTPATIENT)
Dept: FAMILY MEDICINE CLINIC | Facility: CLINIC | Age: 55
End: 2025-07-14
Payer: COMMERCIAL

## 2025-07-14 VITALS
OXYGEN SATURATION: 98 % | RESPIRATION RATE: 18 BRPM | HEART RATE: 74 BPM | DIASTOLIC BLOOD PRESSURE: 74 MMHG | SYSTOLIC BLOOD PRESSURE: 124 MMHG | BODY MASS INDEX: 27.28 KG/M2 | HEIGHT: 75 IN | WEIGHT: 219.38 LBS

## 2025-07-14 DIAGNOSIS — Z79.899 MEDICATION MANAGEMENT: ICD-10-CM

## 2025-07-14 DIAGNOSIS — M77.11 LATERAL EPICONDYLITIS OF RIGHT ELBOW: ICD-10-CM

## 2025-07-14 DIAGNOSIS — Z71.82 EXERCISE COUNSELING: ICD-10-CM

## 2025-07-14 DIAGNOSIS — E66.3 OVERWEIGHT (BMI 25.0-29.9): Primary | ICD-10-CM

## 2025-07-14 DIAGNOSIS — Z86.39 HISTORY OF OBESITY: ICD-10-CM

## 2025-07-14 DIAGNOSIS — Z71.3 DIETARY COUNSELING: ICD-10-CM

## 2025-07-14 LAB
ATRIAL RATE: 67 BPM
P AXIS: 39 DEGREES
P-R INTERVAL: 164 MS
Q-T INTERVAL: 384 MS
QRS DURATION: 86 MS
QTC CALCULATION (BEZET): 405 MS
R AXIS: 38 DEGREES
T AXIS: 58 DEGREES
VENTRICULAR RATE: 67 BPM

## 2025-07-14 RX ORDER — IBUPROFEN 800 MG/1
800 TABLET, FILM COATED ORAL EVERY 6 HOURS PRN
COMMUNITY

## 2025-07-14 NOTE — PROGRESS NOTES
[facility], [address] [address]  Dept: [phone]       Patient:  Dariel Bray  :      1970  MRN:      [id number]    Chief Complaint:    Chief Complaint   Patient presents with    Weight Check    Pain     Right arm and left wrist        SUBJECTIVE     History of Present Illness:  Dariel is being seen today for a follow-up for weight.  History of Present Illness  Jay Bray is a 54 year old male who presents with persistent hand pain.    He has been experiencing persistent hand pain that significantly affects his daily activities, including gardening and sleep. The pain is severe enough to cause him to cry in his sleep. He has been using an ice pack and arthritis cream without relief. The pain has prevented him from golfing all summer, impacting his social activities, including an upcoming golf trip with family.    He recalls a previous injection that provided relief and is seeking another shot. He attempted to make an appointment with Dr. Lane or Dr. Bryant, who were recommended by friends, but they are currently booked.    He is currently taking nine medications every morning, though specific medications for the hand pain are not detailed.    He notes a recent weight gain of two pounds, which he attributes to increased red meat consumption and reduced physical activity due to spending more time gardening. His weight has plateaued between 214 and 218 pounds, which he attributes to less biking and more time spent gardening due to the heat and lack of rain.    He reports averaging five to seven hours of sleep per night, which he feels is insufficient, as he feels best with nine hours of sleep.            Past Medical History:   Past Medical History:    Abdominal hernia    AR (allergic rhinitis)    Bloating    Calculus of kidney    Chest pain    Esophageal ring    GERD (gastroesophageal reflux  disease)    Heartburn    HIGH CHOLESTEROL    not on medication    Hyperlipidemia    Kidney stone    ALVAREZ (nonalcoholic steatohepatitis)    Plantar fasciitis    Problems with swallowing    Sleep apnea    Unspecified sleep apnea    AHI 23.7 SaO2 87% CPAP 8 [facility]          OBJECTIVE     Vitals: /74 (BP Location: Left arm, Patient Position: Sitting, Cuff Size: large)   Pulse 74   Resp 18   Ht 6' 3\" (1.905 m)   Wt 219 lb 6 oz (99.5 kg)   SpO2 98%   BMI 27.42 kg/m²     Initial weight loss: -2   Total weight loss: 32   Start weight: 251    Wt Readings from Last 3 Encounters:   07/14/25 219 lb 6 oz (99.5 kg)   06/16/25 217 lb 2 oz (98.5 kg)   04/30/25 214 lb (97.1 kg)       Patient Medications:    Current Outpatient Medications   Medication Sig Dispense Refill    ibuprofen 800 MG Oral Tab Take 1 tablet (800 mg total) by mouth every 6 (six) hours as needed for Pain.      Phentermine HCl 37.5 MG Oral Tab Take 1 tablet (37.5 mg total) by mouth every morning before breakfast. 30 tablet 1    APPLE CIDER VINEGAR OR Take by mouth As Directed.      Misc Natural Products (GERMANIUM OR) Take by mouth As Directed.      Resmetirom 80 MG Oral Tab Take 80 mg by mouth daily.      Vitamin D-Vitamin K (D3 + K2) 125-100 MCG Oral Cap Take by mouth As Directed.      vitamin E 400 UNITS Oral Cap Take 1 capsule (400 Units total) by mouth daily.      SYMBICORT 160-4.5 MCG/ACT Inhalation Aerosol Inhale 2 puffs into the lungs 2 (two) times daily. 3 each 1    aspirin (ASPIR-LOW) 81 MG Oral Tab EC       Cholecalciferol (VITAMIN D3) 50 MCG (2000 UT) Oral Chew Tab       Cyanocobalamin (VITAMIN B-12) 1000 MCG/15ML Oral Liquid       Loratadine 10 MG Oral Cap       Omega-3 Fatty Acids (FISH OIL) 1200 MG Oral Cap       NON FORMULARY Take 1 capsule by mouth daily. D3 125 MCG (5000 IU) + K2 100 MCG (MK-7)      AZELASTINE 0.1 % Nasal Solution USE 2 SPRAYS IN EACH NOSTRIL TWICE DAILY 90 mL 0    Fluticasone Propionate 50 MCG/ACT Nasal Suspension  2 sprays by Each Nare route daily. 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   Tobacco Use    Smoking status: Never     Passive exposure: Never    Smokeless tobacco: Never   Vaping Use    Vaping status: Never Used   Substance and Sexual Activity    Alcohol use: Yes     Alcohol/week: 3.0 standard drinks of alcohol     Types: 3 Glasses of wine per week     Comment: social-cutting back to only 1 glass of wine    Drug use: No    Sexual activity: Yes   Other Topics Concern    Not on file   Social History Narrative    Not on file     Social Drivers of Health     Food Insecurity: No Food Insecurity (4/14/2025)    NCSS - Food Insecurity     Worried About Running Out of Food in the Last Year: No     Ran Out of Food in the Last Year: No   Transportation Needs: No Transportation Needs (4/14/2025)    NCSS - Transportation     Lack of Transportation: No   Stress: Not on file   Housing Stability: Not At Risk (4/14/2025)    NCSS - Housing/Utilities     Has Housing: Yes     Worried About Losing Housing: No     Unable to Get Utilities: No     Surgical History:    Past Surgical History:   Procedure Laterality Date    Lithotripsy      Other surgical history      foot    Other surgical history      lasik    Other surgical history      acl    Other surgical history      wrist,right    Other surgical history  10/22/13    Cystoscopy - Dr. Devi     Up gi endoscopy,pete w guide  9/24/13= Esophageal ring    No Jaimes's     Family History:    Family History   Problem Relation Age of Onset    Diabetes Other     Other (Other) Father         copd    Diabetes Mother      Food Journal  Reviewed and Discussed:          Patient has a Food  Journal?: no   Patient is reading nutrition labels?  yes  Average Caloric Intake: ?  unsure  Average CHO Intake: ? Unsure   Is patient exercising?  yes  Type of exercise? Walking and biking, gardening      Eating Habits  Patient states the following:  Meals a day? 2-3  Length of time it takes to consume a meal: 20  # of snacks per day: 0-1     Type of snacks:  nuts, fruits, etc   Amount of soda consumption per day:  none  Amount of water (in ounces) per day:  64 oz or more  Drinking between meals only:   Toughest challenge:  Traveling, getting on a daily routine.           The following individual(s) verbally consented to be recorded using ambient AI listening technology and understand that they can each withdraw their consent to this listening technology at any point by asking the clinician to turn off or pause the recording:     Patient name: Dariel Bray      ROS:    Pertinent items are noted in HPI.  A comprehensive review of systems was negative.  All other systems were reviewed and are negative    Physical Exam:   /74 (BP Location: Left arm, Patient Position: Sitting, Cuff Size: large)   Pulse 74   Resp 18   Ht 6' 3\" (1.905 m)   Wt 219 lb 6 oz (99.5 kg)   SpO2 98%   BMI 27.42 kg/m²   General appearance: alert, appears stated age, and cooperative  Head: Normocephalic, without obvious abnormality, atraumatic  Neck: no adenopathy, no carotid bruit, no JVD, supple, symmetrical, trachea midline, and thyroid mildy enlarged, symmetric, no tenderness/mass/nodules  Lungs: clear to auscultation bilaterally  Heart: S1, S2 normal, no murmur, click, rub or gallop, regular rate and rhythm  Abdomen: soft, non-tender; bowel sounds normal; no masses,  no organomegaly  Extremities: extremities normal, atraumatic, no cyanosis or edema  Skin: Skin color, texture, turgor normal. No rashes or lesions    ASSESSMENT/PLAN     Encounter Diagnoses   Name Primary?    Overweight (BMI 25.0-29.9) Yes    Medication  management     Lateral epicondylitis of right elbow     History of obesity     Exercise counseling     Dietary counseling              No orders of the defined types were placed in this encounter.      Nutritional Goals  Calorie-controlled diet:  1800    ON phentermine full tab.  EKG done today -- NSR, no acute change; rate of 67, QT within a normal range  Discussed losing 2-4 lbs in 1 month.  Discussed to focus on diet and exercise.  Meds refilled.  Due for physical.          Behavior Modifications Reviewed and Discussed  Drink 64 oz of water per day, Utlize portion control strategies to reduce calorie intake, Identify triggers for eating and manage cues, and Eat slowly and take 20 to 30 minutes to complete each meal    Exercise Goals Reviewed and Discussed    Walk for  30 Minutes          Meds & Refills for this Visit:  Requested Prescriptions      No prescriptions requested or ordered in this encounter       Imaging & Consults:  ELECTROCARDIOGRAM, COMPLETE    Assessment & Plan  Lateral epicondylitis of right elbow  Chronic right elbow pain worsened by activities and during sleep. Ice and arthritis cream ineffective.  - Check for appointment cancellations to expedite referral.    Overweight  BMI 27. Recent weight gain due to diet changes and reduced activity. Aims for 205 pounds. Sleep deprivation may affect health and weight.  - Encourage focus on weight management.  - Advise on improving sleep hygiene for 7-8 hours per night.    General Health Maintenance  Overdue for EKG due to phentermine use.  - Perform EKG today.          Return in about 4 weeks (around 8/11/2025) for OM -15.

## 2025-07-14 NOTE — PROGRESS NOTES
Food Journal  Reviewed and Discussed:          Patient has a Food Journal?: no   Patient is reading nutrition labels?  yes  Average Caloric Intake: ?  unsure  Average CHO Intake: ? Unsure   Is patient exercising?  yes  Type of exercise? Walking and biking, gardening      Eating Habits  Patient states the following:  Meals a day? 2-3  Length of time it takes to consume a meal: 20  # of snacks per day: 0-1     Type of snacks:  nuts, fruits, etc   Amount of soda consumption per day:  none  Amount of water (in ounces) per day:  64 oz or more  Drinking between meals only:   Toughest challenge:  Traveling, getting on a daily routine.          The following individual(s) verbally consented to be recorded using ambient AI listening technology and understand that they can each withdraw their consent to this listening technology at any point by asking the clinician to turn off or pause the recording:    Patient name: Dariel Gray Bray

## 2025-07-16 DIAGNOSIS — Z71.3 DIETARY COUNSELING: ICD-10-CM

## 2025-07-16 DIAGNOSIS — Z71.82 EXERCISE COUNSELING: ICD-10-CM

## 2025-07-18 ENCOUNTER — TELEPHONE (OUTPATIENT)
Dept: FAMILY MEDICINE CLINIC | Facility: CLINIC | Age: 55
End: 2025-07-18

## 2025-07-18 RX ORDER — PHENTERMINE HYDROCHLORIDE 37.5 MG/1
37.5 TABLET ORAL
Qty: 30 TABLET | Refills: 1 | Status: SHIPPED | OUTPATIENT
Start: 2025-07-18

## 2025-07-18 NOTE — TELEPHONE ENCOUNTER
Patient Comment: A little early, but I am traveling on vacation Wednesday July 23rd through August 3rd, so I am hoping to  my medication before I leave on my trip. Thank you for the consideration.

## 2025-07-18 NOTE — TELEPHONE ENCOUNTER
Last office visit: 07/14/2025   Protocol: PASS    Requested medication(s) are due for refill today: Yes    Requested medication(s) are on the active medication list same strength, form, dose/ sig: Yes    Requested medication(s) are managed by provider: Yes    Patient has already received a courtsey refill: No    NOV: 8/11/2025  Labs: 03/12/2025  Asked to Return: 8/11/2025

## 2025-08-11 ENCOUNTER — OFFICE VISIT (OUTPATIENT)
Dept: FAMILY MEDICINE CLINIC | Facility: CLINIC | Age: 55
End: 2025-08-11

## 2025-08-11 VITALS
HEIGHT: 75 IN | OXYGEN SATURATION: 98 % | HEART RATE: 82 BPM | DIASTOLIC BLOOD PRESSURE: 72 MMHG | RESPIRATION RATE: 18 BRPM | WEIGHT: 221.38 LBS | SYSTOLIC BLOOD PRESSURE: 124 MMHG | BODY MASS INDEX: 27.53 KG/M2

## 2025-08-11 DIAGNOSIS — M25.561 ACUTE PAIN OF RIGHT KNEE: ICD-10-CM

## 2025-08-11 DIAGNOSIS — M77.11 LATERAL EPICONDYLITIS OF RIGHT ELBOW: ICD-10-CM

## 2025-08-11 DIAGNOSIS — Z71.82 EXERCISE COUNSELING: ICD-10-CM

## 2025-08-11 DIAGNOSIS — Z86.39 HISTORY OF OBESITY: ICD-10-CM

## 2025-08-11 DIAGNOSIS — Z79.899 MEDICATION MANAGEMENT: ICD-10-CM

## 2025-08-11 DIAGNOSIS — Z71.3 DIETARY COUNSELING: ICD-10-CM

## 2025-08-11 DIAGNOSIS — E66.3 OVERWEIGHT (BMI 25.0-29.9): Primary | ICD-10-CM

## 2025-08-11 PROCEDURE — 99214 OFFICE O/P EST MOD 30 MIN: CPT | Performed by: FAMILY MEDICINE

## 2025-08-12 ENCOUNTER — HOSPITAL ENCOUNTER (OUTPATIENT)
Dept: GENERAL RADIOLOGY | Age: 55
Discharge: HOME | End: 2025-08-12
Attending: FAMILY MEDICINE

## 2025-08-12 DIAGNOSIS — M25.561 ACUTE PAIN OF RIGHT KNEE: ICD-10-CM

## 2025-08-12 PROCEDURE — 73562 X-RAY EXAM OF KNEE 3: CPT | Performed by: FAMILY MEDICINE

## 2025-08-25 DIAGNOSIS — Z71.3 DIETARY COUNSELING: ICD-10-CM

## 2025-08-25 DIAGNOSIS — Z71.82 EXERCISE COUNSELING: ICD-10-CM

## 2025-08-25 RX ORDER — PHENTERMINE HYDROCHLORIDE 37.5 MG/1
37.5 TABLET ORAL
Qty: 30 TABLET | Refills: 1 | Status: SHIPPED | OUTPATIENT
Start: 2025-08-25

## 2025-08-26 ENCOUNTER — TELEPHONE (OUTPATIENT)
Dept: FAMILY MEDICINE CLINIC | Facility: CLINIC | Age: 55
End: 2025-08-26

## 2025-08-26 DIAGNOSIS — E11.9 TYPE 2 DIABETES MELLITUS WITHOUT COMPLICATION, UNSPECIFIED WHETHER LONG TERM INSULIN USE (HCC): Primary | ICD-10-CM

## 2025-08-26 DIAGNOSIS — Z71.3 DIETARY COUNSELING: ICD-10-CM

## 2025-08-26 DIAGNOSIS — Z71.82 EXERCISE COUNSELING: ICD-10-CM

## 2025-08-28 RX ORDER — PHENTERMINE HYDROCHLORIDE 37.5 MG/1
37.5 TABLET ORAL
Qty: 30 TABLET | Refills: 1 | OUTPATIENT
Start: 2025-08-28

## (undated) NOTE — LETTER
Date: 1/6/2025    Patient Name: Dariel Gray Asa Thompson,  The facility where my  gets a lot of his gym equipment is [facility] in [place].  [phone]  [employer]    Hope this helps!    Sincerely,    Yudy Covarrubias, DO

## (undated) NOTE — LETTER
ASTHMA ACTION PLAN for Dariel Bray     : 1970     Date: 3/12/2025  Provider:  Yudy Covarrubias DO  Phone for doctor or clinic: [facility], [facility], [address]  [facility] [id number] [phone]    ACT Score: 25      You can use the colors of a traffic light to help learn about your asthma medicines.      1. Green - Go! % of Personal Best Peak Flow Use controller medicine.   Breathing is good  No cough or wheeze  Can work and play Medicine How much to take When to take it    Loratadine 10 mg- take one tablet by mouth daily.    Azelastine 0.1% Nasal Solution-  use 2 sprays in each nostril twice daily     Flonase 50 MCG/ACT- use 2 sprays in each nostril daily    Symbicort 160-4.5 MCG/ACT Inhalation Aerosol- inhale 2 puffs into the lungs two times daily       2. Yellow - Caution. 50-79% Personal Best Peak  Flow.  Use reliever medicine to keep an asthma attack from getting bad.   Cough  Wheezing  Tight Chest  Wake up at night Medicine How much to take When to take it    Albuterol inhaler,  2 puffs every four hours as needed.        Additional instructions         3. Red - Stop! Danger!  <50% Personal Best Peak  Flow. Take these medications until  Get help from a doctor   Medicine not helping  Breathing is hard and fast  Nose opens wide  Can't walk  Ribs show  Can't talk well Medicine How much to take When to take it    Albuterol inhaler,  2 puffs every five minutes as needed until symptoms resolve, if symptoms do not resolve call 911 or head to the nearest emergency room.    DO NOT DRIVE YOURSELF!!!!     Additional Instructions If your symptoms do not improve and you cannot contact your doctor[facility] immediately!     [x] Asthma Action Plan reviewed with patient (and caregiver if necessary) and a copy of the plan was given to the patient/caregiver.   [] Asthma Action Plan reviewed with patient (and caregiver if necessary)  on the phone and mailed copy to patient or submitted via [employer].     Signatures:  Provider  Yudy Covarrubias DO   Patient Caretaker

## (undated) NOTE — LETTER
ASTHMA ACTION PLAN for Dariel Bray     : 1970     Date: 2024  Provider:  Yudy Covarrubias DO  Phone for doctor or clinic: [facility], [facility], [address]  [facility] [phone] [phone]    ACT Score: 25      You can use the colors of a traffic light to help learn about your asthma medicines.      1. Green - Go! % of Personal Best Peak Flow Use controller medicine.   Breathing is good  No cough or wheeze  Can work and play Medicine How much to take When to take it    Loratadine 10 mg- take one tablet daily    Budesonide 0.5 mg/ 2ML inhalation suspension- inhale 2 mL into the lungs two times daily    Symbicort 160-4.5 MCG/ACT inhalation aerosol- inhale 2 puffs twice a day    Azelastine HCL 0.1%- 2 sprays by nasal route daily    Flonase 50 MCG/ACT- 2 sprays in each nostril daily      2. Yellow - Caution. 50-79% Personal Best Peak  Flow.  Use reliever medicine to keep an asthma attack from getting bad.   Cough  Wheezing  Tight Chest  Wake up at night Medicine How much to take When to take it    Albuterol inhaler,  2 puffs every four hours as needed.        Additional instructions         3. Red - Stop! Danger!  <50% Personal Best Peak  Flow. Take these medications until  Get help from a doctor   Medicine not helping  Breathing is hard and fast  Nose opens wide  Can't walk  Ribs show  Can't talk well Medicine How much to take When to take it    Albuterol inhaler,  2 puffs every five minutes as needed until symptoms resolve, if symptoms do not resolve call 911 or head to nearest emergency room.     Additional Instructions If your symptoms do not improve and you cannot contact your doctor[facility] or [facility] immediately!     [x] Asthma Action Plan reviewed with patient (and caregiver if necessary) and a copy of the plan was given to the patient/caregiver.   [] Asthma Action Plan reviewed with patient (and caregiver if  necessary) on the phone and mailed copy to patient or submitted via [employer].     Signatures:  Provider  Yudy Covarrubias DO   Patient Caretaker